# Patient Record
Sex: FEMALE | Race: WHITE | HISPANIC OR LATINO | Employment: UNEMPLOYED | URBAN - METROPOLITAN AREA
[De-identification: names, ages, dates, MRNs, and addresses within clinical notes are randomized per-mention and may not be internally consistent; named-entity substitution may affect disease eponyms.]

---

## 2017-03-13 ENCOUNTER — RESOLUTE PROFESSIONAL BILLING HOSPITAL PROF FEE (OUTPATIENT)
Dept: HOSPITALIST | Facility: MEDICAL CENTER | Age: 32
End: 2017-03-13
Payer: COMMERCIAL

## 2017-03-13 ENCOUNTER — HOSPITAL ENCOUNTER (OUTPATIENT)
Facility: MEDICAL CENTER | Age: 32
End: 2017-03-14
Attending: EMERGENCY MEDICINE | Admitting: INTERNAL MEDICINE
Payer: COMMERCIAL

## 2017-03-13 DIAGNOSIS — D50.0 IRON DEFICIENCY ANEMIA DUE TO CHRONIC BLOOD LOSS: ICD-10-CM

## 2017-03-13 LAB
ABO GROUP BLD: NORMAL
ABO GROUP BLD: NORMAL
ALBUMIN SERPL BCP-MCNC: 4.2 G/DL (ref 3.2–4.9)
ALBUMIN/GLOB SERPL: 1.4 G/DL
ALP SERPL-CCNC: 94 U/L (ref 30–99)
ALT SERPL-CCNC: 8 U/L (ref 2–50)
ANION GAP SERPL CALC-SCNC: 7 MMOL/L (ref 0–11.9)
ANISOCYTOSIS BLD QL SMEAR: ABNORMAL
APTT PPP: 33.2 SEC (ref 24.7–36)
AST SERPL-CCNC: 17 U/L (ref 12–45)
BARCODED ABORH UBTYP: 5100
BARCODED PRD CODE UBPRD: NORMAL
BARCODED UNIT NUM UBUNT: NORMAL
BASOPHILS # BLD AUTO: 0 % (ref 0–1.8)
BASOPHILS # BLD: 0 K/UL (ref 0–0.12)
BILIRUB SERPL-MCNC: 0.5 MG/DL (ref 0.1–1.5)
BLD GP AB SCN SERPL QL: NORMAL
BUN SERPL-MCNC: 6 MG/DL (ref 8–22)
CALCIUM SERPL-MCNC: 8.9 MG/DL (ref 8.5–10.5)
CHLORIDE SERPL-SCNC: 107 MMOL/L (ref 96–112)
CO2 SERPL-SCNC: 25 MMOL/L (ref 20–33)
COMPONENT R 8504R: NORMAL
CREAT SERPL-MCNC: 0.48 MG/DL (ref 0.5–1.4)
EOSINOPHIL # BLD AUTO: 0 K/UL (ref 0–0.51)
EOSINOPHIL NFR BLD: 0 % (ref 0–6.9)
ERYTHROCYTE [DISTWIDTH] IN BLOOD BY AUTOMATED COUNT: 48.7 FL (ref 35.9–50)
GFR SERPL CREATININE-BSD FRML MDRD: >60 ML/MIN/1.73 M 2
GIANT PLATELETS BLD QL SMEAR: NORMAL
GLOBULIN SER CALC-MCNC: 3 G/DL (ref 1.9–3.5)
GLUCOSE SERPL-MCNC: 105 MG/DL (ref 65–99)
HCG SERPL QL: NEGATIVE
HCT VFR BLD AUTO: 26 % (ref 37–47)
HGB BLD-MCNC: 6.7 G/DL (ref 12–16)
HYPOCHROMIA BLD QL SMEAR: ABNORMAL
INR PPP: 1 (ref 0.87–1.13)
LYMPHOCYTES # BLD AUTO: 0.81 K/UL (ref 1–4.8)
LYMPHOCYTES NFR BLD: 19.3 % (ref 22–41)
MANUAL DIFF BLD: NORMAL
MCH RBC QN AUTO: 16.5 PG (ref 27–33)
MCHC RBC AUTO-ENTMCNC: 25.8 G/DL (ref 33.6–35)
MCV RBC AUTO: 64.2 FL (ref 81.4–97.8)
MICROCYTES BLD QL SMEAR: ABNORMAL
MONOCYTES # BLD AUTO: 0.08 K/UL (ref 0–0.85)
MONOCYTES NFR BLD AUTO: 1.8 % (ref 0–13.4)
MORPHOLOGY BLD-IMP: NORMAL
NEUTROPHILS # BLD AUTO: 3.31 K/UL (ref 2–7.15)
NEUTROPHILS NFR BLD: 78.9 % (ref 44–72)
NRBC # BLD AUTO: 0 K/UL
NRBC BLD AUTO-RTO: 0 /100 WBC
OVALOCYTES BLD QL SMEAR: NORMAL
PLATELET # BLD AUTO: 184 K/UL (ref 164–446)
PLATELET BLD QL SMEAR: NORMAL
POIKILOCYTOSIS BLD QL SMEAR: NORMAL
POLYCHROMASIA BLD QL SMEAR: NORMAL
POTASSIUM SERPL-SCNC: 3.6 MMOL/L (ref 3.6–5.5)
PRODUCT TYPE UPROD: NORMAL
PROT SERPL-MCNC: 7.2 G/DL (ref 6–8.2)
PROTHROMBIN TIME: 13.5 SEC (ref 12–14.6)
RBC # BLD AUTO: 4.05 M/UL (ref 4.2–5.4)
RBC BLD AUTO: PRESENT
RH BLD: NORMAL
SCHISTOCYTES BLD QL SMEAR: NORMAL
SODIUM SERPL-SCNC: 139 MMOL/L (ref 135–145)
TROPONIN I SERPL-MCNC: <0.01 NG/ML (ref 0–0.04)
UNIT STATUS USTAT: NORMAL
WBC # BLD AUTO: 4.2 K/UL (ref 4.8–10.8)

## 2017-03-13 PROCEDURE — 86850 RBC ANTIBODY SCREEN: CPT

## 2017-03-13 PROCEDURE — 84703 CHORIONIC GONADOTROPIN ASSAY: CPT

## 2017-03-13 PROCEDURE — 84484 ASSAY OF TROPONIN QUANT: CPT

## 2017-03-13 PROCEDURE — 85610 PROTHROMBIN TIME: CPT

## 2017-03-13 PROCEDURE — 99285 EMERGENCY DEPT VISIT HI MDM: CPT

## 2017-03-13 PROCEDURE — 85007 BL SMEAR W/DIFF WBC COUNT: CPT

## 2017-03-13 PROCEDURE — G0378 HOSPITAL OBSERVATION PER HR: HCPCS

## 2017-03-13 PROCEDURE — 85027 COMPLETE CBC AUTOMATED: CPT

## 2017-03-13 PROCEDURE — 86901 BLOOD TYPING SEROLOGIC RH(D): CPT

## 2017-03-13 PROCEDURE — 99220 PR INITIAL OBSERVATION CARE,LEVL III: CPT | Performed by: INTERNAL MEDICINE

## 2017-03-13 PROCEDURE — 80053 COMPREHEN METABOLIC PANEL: CPT

## 2017-03-13 PROCEDURE — 86900 BLOOD TYPING SEROLOGIC ABO: CPT

## 2017-03-13 PROCEDURE — 85730 THROMBOPLASTIN TIME PARTIAL: CPT

## 2017-03-13 PROCEDURE — 36415 COLL VENOUS BLD VENIPUNCTURE: CPT

## 2017-03-13 PROCEDURE — 93005 ELECTROCARDIOGRAM TRACING: CPT | Performed by: EMERGENCY MEDICINE

## 2017-03-13 RX ORDER — FOLIC ACID 1 MG/1
1 TABLET ORAL DAILY
Status: DISCONTINUED | OUTPATIENT
Start: 2017-03-14 | End: 2017-03-14 | Stop reason: HOSPADM

## 2017-03-13 RX ORDER — AMOXICILLIN 250 MG
2 CAPSULE ORAL 2 TIMES DAILY
Status: DISCONTINUED | OUTPATIENT
Start: 2017-03-14 | End: 2017-03-14 | Stop reason: HOSPADM

## 2017-03-13 RX ORDER — BISACODYL 10 MG
10 SUPPOSITORY, RECTAL RECTAL
Status: DISCONTINUED | OUTPATIENT
Start: 2017-03-13 | End: 2017-03-14 | Stop reason: HOSPADM

## 2017-03-13 RX ORDER — ACETAMINOPHEN 325 MG/1
650 TABLET ORAL EVERY 6 HOURS PRN
Status: DISCONTINUED | OUTPATIENT
Start: 2017-03-13 | End: 2017-03-14 | Stop reason: HOSPADM

## 2017-03-13 RX ORDER — FERROUS SULFATE 325(65) MG
325 TABLET ORAL 3 TIMES DAILY
Status: ON HOLD | COMMUNITY
End: 2017-03-14

## 2017-03-13 RX ORDER — CHOLECALCIFEROL (VITAMIN D3) 125 MCG
500 CAPSULE ORAL DAILY
COMMUNITY

## 2017-03-13 RX ORDER — POLYETHYLENE GLYCOL 3350 17 G/17G
1 POWDER, FOR SOLUTION ORAL
Status: DISCONTINUED | OUTPATIENT
Start: 2017-03-13 | End: 2017-03-14 | Stop reason: HOSPADM

## 2017-03-13 RX ORDER — FERROUS SULFATE 325(65) MG
325 TABLET ORAL
Status: DISCONTINUED | OUTPATIENT
Start: 2017-03-13 | End: 2017-03-14

## 2017-03-13 ASSESSMENT — ENCOUNTER SYMPTOMS
BACK PAIN: 1
FOCAL WEAKNESS: 0
CHILLS: 0
CONSTIPATION: 0
NECK PAIN: 1
DIARRHEA: 0
SEIZURES: 0
HEADACHES: 1
DIZZINESS: 1
VOMITING: 0
ABDOMINAL PAIN: 1
LOSS OF CONSCIOUSNESS: 0
WEIGHT LOSS: 0
SPUTUM PRODUCTION: 0
COUGH: 0
FEVER: 0
PND: 0
TINGLING: 0
DEPRESSION: 0
BLURRED VISION: 0
DIZZINESS: 0
NAUSEA: 1
BLOOD IN STOOL: 1
SHORTNESS OF BREATH: 1
WEAKNESS: 1
DOUBLE VISION: 0
PHOTOPHOBIA: 0
PALPITATIONS: 1

## 2017-03-13 ASSESSMENT — LIFESTYLE VARIABLES
HAVE YOU EVER FELT YOU SHOULD CUT DOWN ON YOUR DRINKING: NO
HAVE PEOPLE ANNOYED YOU BY CRITICIZING YOUR DRINKING: NO
ON A TYPICAL DAY WHEN YOU DRINK ALCOHOL HOW MANY DRINKS DO YOU HAVE: 2
EVER HAD A DRINK FIRST THING IN THE MORNING TO STEADY YOUR NERVES TO GET RID OF A HANGOVER: NO
DO YOU DRINK ALCOHOL: YES
HOW MANY TIMES IN THE PAST YEAR HAVE YOU HAD 5 OR MORE DRINKS IN A DAY: 2
CONSUMPTION TOTAL: POSITIVE
AVERAGE NUMBER OF DAYS PER WEEK YOU HAVE A DRINK CONTAINING ALCOHOL: 2
EVER FELT BAD OR GUILTY ABOUT YOUR DRINKING: NO
TOTAL SCORE: 0

## 2017-03-13 ASSESSMENT — PAIN SCALES - GENERAL
PAINLEVEL_OUTOF10: 7
PAINLEVEL_OUTOF10: 8

## 2017-03-13 NOTE — IP AVS SNAPSHOT
" Home Care Instructions                                                                                                                  Name:Felisha Martin  Medical Record Number:[id number]  CSN: [id number]    YOB: 1985   Age: 31 y.o.  Sex: female  HT:1.6 m (5' 3\") WT: 68.5 kg (151 lb 0.2 oz)          Admit Date: 3/13/2017     Discharge Date:   Today's Date: 3/14/2017  Attending Doctor:  Yolanda Martínez M.D.                  Allergies:  Review of patient's allergies indicates no known allergies.            Discharge Instructions       Discharge Instructions    Discharged to home by car with relative. Discharged via walking, hospital escort: Yes.  Special equipment needed: Not Applicable    Be sure to schedule a follow-up appointment with your primary care doctor or any specialists as instructed.     Discharge Plan:   Diet Plan: Discussed (Regular)  Activity Level: Discussed (As tolerated)  Confirmed Follow up Appointment: Patient to Call and Schedule Appointment  Confirmed Symptoms Management: Discussed  Medication Reconciliation Updated: Yes  Influenza Vaccine Indication: Not indicated: Previously immunized this influenza season and > 8 years of age    I understand that a diet low in cholesterol, fat, and sodium is recommended for good health. Unless I have been given specific instructions below for another diet, I accept this instruction as my diet prescription.   Other diet: Regular    Special Instructions: None    · Is patient discharged on Warfarin / Coumadin?   No     · Is patient Post Blood Transfusion?  No    Depression / Suicide Risk    As you are discharged from this [facility] facility, it is important to learn how to keep safe from harming yourself.    Recognize the warning signs:  · Abrupt changes in personality, positive or negative- including increase in energy   · Giving away possessions  · Change in eating patterns- significant weight changes-  positive or negative  · Change in sleeping " patterns- unable to sleep or sleeping all the time   · Unwillingness or inability to communicate  · Depression  · Unusual sadness, discouragement and loneliness  · Talk of wanting to die  · Neglect of personal appearance   · Rebelliousness- reckless behavior  · Withdrawal from people/activities they love  · Confusion- inability to concentrate     If you or a loved one observes any of these behaviors or has concerns about self-harm, here's what you can do:  · Talk about it- your feelings and reasons for harming yourself  · Remove any means that you might use to hurt yourself (examples: pills, rope, extension cords, firearm)  · Get professional help from the community (Mental Health, Substance Abuse, psychological counseling)  · Do not be alone:Call your Safe Contact- someone whom you trust who will be there for you.  · Call your local CRISIS HOTLINE 456-9947 or 865-495-6019  · Call your local Children's Mobile Crisis Response Team [place] (350) 752-0220 or www.Reppler  · Call the toll free National Suicide Prevention Hotlines   · National Suicide Prevention Lifeline 816-702-NZEL (3576)  · [employer] Hope Line Network 800-SUICIDE (173-4334)    Activity: As tolerated.   Diet: regular   Followup:   -PCP 1 week   -GI referral outpatient   -GYN consult outpatient   Instructions:   -Given instructions to return to the ER if any new or worsening symptoms, worsening condition, or failure to improve   -Call PCP for followup   -No smoking, no alcohol, no caffeine   -Encourage risk factor reduction with tobacco and alcohol abstinence, diet changes, weight loss, and exercise.    Your appointments     Mar 15, 2017 11:30 AM   CARE MANAGER TELEPHONE VISIT with CARE MANAGER   [facility] ([facility])    [address] [place] [address]  [place] [address] [phone]           ***IMPORTANT**** This is a phone visit only. Do not come into the office. The Care Manager will call you at the scheduled time for Care  Manager Telephone Visit.            Mar 16, 2017  3:20 PM   Established Patient with CHANA Ceron   [facility] ([facility])    [address] [place] [address] [address]  [place] [address] [phone]           You will be receiving a confirmation call a few days before your appointment from our automated call confirmation system.            Mar 18, 2017 10:30 AM   New Total Body Iron with RN 4   [facility] ([facility])    [address] [facility] [address]  [place] [id number] [phone]              Follow-up Information     1. Follow up with Your PCP. Schedule an appointment as soon as possible for a visit in 1 week.        2. Call GI consult.    Why:  Discuss with PCP for referral        3. Call GYN consult.    Why:  Discuss with PCP for referral        4. Follow up with Tanya Neal M.D.. Schedule an appointment as soon as possible for a visit in 1 week.    Specialty:  Oncology    Why:  for hematology followup    Contact information    [address]   [place] [id number] [phone]           Discharge Medication Instructions:    Below are the medications your physician expects you to take upon discharge:    Review all your home medications and newly ordered medications with your doctor and/or pharmacist. Follow medication instructions as directed by your doctor and/or pharmacist.    Please keep your medication list with you and share with your physician.               Medication List      START taking these medications        Instructions    Cholecalciferol 1000 UNIT Tabs   Commonly known as:  VITAMIND D3    Take 1 Tab by mouth every day.   Dose:  1000 Units       multivitamin Tabs   Last time this was given:  1 Tab on 3/14/2017  8:50 AM    Take 1 Tab by mouth every day.   Dose:  1 Tab       omeprazole 20 MG delayed-release capsule   Commonly known as:  PRILOSEC    Take 1 Cap by mouth 2 times a day. Take BID for 30 days then daily   Dose:  20 mg         CONTINUE taking these medications         Instructions    cyanocobalamin 500 MCG Tabs   Commonly known as:  VITAMIN B-12    Take 500 mcg by mouth every day.   Dose:  500 mcg       PRENATAL #2 PO    Take 1 Tab by mouth every day.   Dose:  1 Tab         STOP taking these medications     IRON SUPPLEMENT 325 (65 FE) MG tablet   Generic drug:  ferrous sulfate               Instructions           Diet / Nutrition:    Follow any diet instructions given to you by your doctor or the dietician, including how much salt (sodium) you are allowed each day.    If you are overweight, talk to your doctor about a weight reduction plan.    Activity:    Remain physically active following your doctor's instructions about exercise and activity.    Rest often.     Any time you become even a little tired or short of breath, SIT DOWN and rest.    Worsening Symptoms:    Report any of the following signs and symptoms to the doctor's office immediately:    *Pain of jaw, arm, or neck  *Chest pain not relieved by medication                               *Dizziness or loss of consciousness  *Difficulty breathing even when at rest   *More tired than usual                                       *Bleeding drainage or swelling of surgical site  *Swelling of feet, ankles, legs or stomach                 *Fever (>100ºF)  *Pink or blood tinged sputum  *Weight gain (3lbs/day or 5lbs /week)           *Shock from internal defibrillator (if applicable)  *Palpitations or irregular heartbeats                *Cool and/or numb extremities    Stroke Awareness    Common Risk Factors for Stroke include:    Age  Atrial Fibrillation  Carotid Artery Stenosis  Diabetes Mellitus  Excessive alcohol consumption  High blood pressure  Overweight   Physical inactivity  Smoking    Warning signs and symptoms of a stroke include:    *Sudden numbness or weakness of the face, arm or leg (especially on one side of the body).  *Sudden confusion, trouble speaking or understanding.  *Sudden trouble seeing in one or both  eyes.  *Sudden trouble walking, dizziness, loss of balance or coordination.Sudden severe headache with no known cause.    It is very important to get treatment quickly when a stroke occurs. If you experience any of the above warning signs, call 911 immediately.                   Disclaimer         Quit Smoking / Tobacco Use:    I understand the use of any tobacco products increases my chance of suffering from future heart disease or stroke and could cause other illnesses which may shorten my life. Quitting the use of tobacco products is the single most important thing I can do to improve my health. For further information on smoking / tobacco cessation call a Toll Free Quit Line at 1-557.838.3434 (*National Cancer [place]) or 1-659.202.5764 (American Lung Association) or you can access the web based program at www.lungMicrotask.org.    [place] Tobacco Users Help Line:  (520) 115-3550       Toll Free: 1-218.684.7032  Quit Tobacco Program [facility] (463)334-1972    Crisis Hotline:    [place] Crisis Hotline:  2-287-FVNVHJF or 1-262.702.8130    [place] Crisis Hotline:    1-429.185.9893 or 287-074-4257    Discharge Survey:   Thank you for choosing [facility]. We hope we did everything we could to make your hospital stay a pleasant one. You may be receiving a phone survey and we would appreciate your time and participation in answering the questions. Your input is very valuable to us in our efforts to improve our service to our patients and their families.        My signature on this form indicates that:    1. I have reviewed and understand the above information.  2. My questions regarding this information have been answered to my satisfaction.  3. I have formulated a plan with my discharge nurse to obtain my prescribed medications for home.                  Disclaimer         __________________________________                     __________       ________                       Patient Signature                                                  Date                    Time

## 2017-03-14 ENCOUNTER — PATIENT OUTREACH (OUTPATIENT)
Dept: HEALTH INFORMATION MANAGEMENT | Facility: OTHER | Age: 32
End: 2017-03-14

## 2017-03-14 VITALS
WEIGHT: 151.01 LBS | BODY MASS INDEX: 26.76 KG/M2 | HEART RATE: 60 BPM | SYSTOLIC BLOOD PRESSURE: 106 MMHG | HEIGHT: 63 IN | RESPIRATION RATE: 16 BRPM | DIASTOLIC BLOOD PRESSURE: 69 MMHG | OXYGEN SATURATION: 98 % | TEMPERATURE: 97.7 F

## 2017-03-14 PROBLEM — D50.0 IRON DEFICIENCY ANEMIA DUE TO CHRONIC BLOOD LOSS: Status: RESOLVED | Noted: 2017-03-13 | Resolved: 2017-03-14

## 2017-03-14 LAB
25(OH)D3 SERPL-MCNC: 10 NG/ML (ref 30–100)
ALBUMIN SERPL BCP-MCNC: 3.7 G/DL (ref 3.2–4.9)
ALBUMIN SERPL BCP-MCNC: 3.7 G/DL (ref 3.2–4.9)
ALBUMIN/GLOB SERPL: 1.4 G/DL
ALBUMIN/GLOB SERPL: 1.4 G/DL
ALP SERPL-CCNC: 82 U/L (ref 30–99)
ALP SERPL-CCNC: 83 U/L (ref 30–99)
ALT SERPL-CCNC: 7 U/L (ref 2–50)
ALT SERPL-CCNC: 9 U/L (ref 2–50)
AMORPH CRY #/AREA URNS HPF: PRESENT /HPF
ANION GAP SERPL CALC-SCNC: 5 MMOL/L (ref 0–11.9)
ANION GAP SERPL CALC-SCNC: 5 MMOL/L (ref 0–11.9)
ANISOCYTOSIS BLD QL SMEAR: ABNORMAL
APPEARANCE UR: ABNORMAL
AST SERPL-CCNC: 13 U/L (ref 12–45)
AST SERPL-CCNC: 13 U/L (ref 12–45)
BACTERIA #/AREA URNS HPF: ABNORMAL /HPF
BASOPHILS # BLD AUTO: 1.1 % (ref 0–1.8)
BASOPHILS # BLD AUTO: 4.4 % (ref 0–1.8)
BASOPHILS # BLD: 0.05 K/UL (ref 0–0.12)
BASOPHILS # BLD: 0.19 K/UL (ref 0–0.12)
BILIRUB SERPL-MCNC: 0.5 MG/DL (ref 0.1–1.5)
BILIRUB SERPL-MCNC: 1 MG/DL (ref 0.1–1.5)
BILIRUB UR QL STRIP.AUTO: NEGATIVE
BUN SERPL-MCNC: 6 MG/DL (ref 8–22)
BUN SERPL-MCNC: 6 MG/DL (ref 8–22)
CALCIUM SERPL-MCNC: 8.3 MG/DL (ref 8.5–10.5)
CALCIUM SERPL-MCNC: 8.4 MG/DL (ref 8.5–10.5)
CHLORIDE SERPL-SCNC: 110 MMOL/L (ref 96–112)
CHLORIDE SERPL-SCNC: 111 MMOL/L (ref 96–112)
CO2 SERPL-SCNC: 23 MMOL/L (ref 20–33)
CO2 SERPL-SCNC: 23 MMOL/L (ref 20–33)
COLOR UR: YELLOW
CREAT SERPL-MCNC: 0.47 MG/DL (ref 0.5–1.4)
CREAT SERPL-MCNC: 0.47 MG/DL (ref 0.5–1.4)
DACRYOCYTES BLD QL SMEAR: NORMAL
EOSINOPHIL # BLD AUTO: 0.09 K/UL (ref 0–0.51)
EOSINOPHIL # BLD AUTO: 0.15 K/UL (ref 0–0.51)
EOSINOPHIL NFR BLD: 2 % (ref 0–6.9)
EOSINOPHIL NFR BLD: 3.5 % (ref 0–6.9)
EPI CELLS #/AREA URNS HPF: ABNORMAL /HPF
ERYTHROCYTE [DISTWIDTH] IN BLOOD BY AUTOMATED COUNT: 47.3 FL (ref 35.9–50)
ERYTHROCYTE [DISTWIDTH] IN BLOOD BY AUTOMATED COUNT: 56.9 FL (ref 35.9–50)
FERRITIN SERPL-MCNC: 2.5 NG/ML (ref 10–291)
FOLATE SERPL-MCNC: 23.1 NG/ML
GFR SERPL CREATININE-BSD FRML MDRD: >60 ML/MIN/1.73 M 2
GFR SERPL CREATININE-BSD FRML MDRD: >60 ML/MIN/1.73 M 2
GIANT PLATELETS BLD QL SMEAR: NORMAL
GLOBULIN SER CALC-MCNC: 2.6 G/DL (ref 1.9–3.5)
GLOBULIN SER CALC-MCNC: 2.7 G/DL (ref 1.9–3.5)
GLUCOSE SERPL-MCNC: 93 MG/DL (ref 65–99)
GLUCOSE SERPL-MCNC: 99 MG/DL (ref 65–99)
GLUCOSE UR STRIP.AUTO-MCNC: NEGATIVE MG/DL
HCT VFR BLD AUTO: 24.3 % (ref 37–47)
HCT VFR BLD AUTO: 27.9 % (ref 37–47)
HGB BLD-MCNC: 6.3 G/DL (ref 12–16)
HGB BLD-MCNC: 7.6 G/DL (ref 12–16)
HGB RETIC QN AUTO: 17 PG/CELL (ref 29–35)
HYPOCHROMIA BLD QL SMEAR: ABNORMAL
IMM GRANULOCYTES # BLD AUTO: 0.01 K/UL (ref 0–0.11)
IMM GRANULOCYTES NFR BLD AUTO: 0.2 % (ref 0–0.9)
IMM RETICS NFR: 13.5 % (ref 9.3–17.4)
IRON SATN MFR SERPL: 3 % (ref 15–55)
IRON SERPL-MCNC: 14 UG/DL (ref 40–170)
KETONES UR STRIP.AUTO-MCNC: NEGATIVE MG/DL
LEUKOCYTE ESTERASE UR QL STRIP.AUTO: ABNORMAL
LG PLATELETS BLD QL SMEAR: NORMAL
LYMPHOCYTES # BLD AUTO: 1.58 K/UL (ref 1–4.8)
LYMPHOCYTES # BLD AUTO: 2.13 K/UL (ref 1–4.8)
LYMPHOCYTES NFR BLD: 36 % (ref 22–41)
LYMPHOCYTES NFR BLD: 47.7 % (ref 22–41)
MANUAL DIFF BLD: NORMAL
MCH RBC QN AUTO: 16.4 PG (ref 27–33)
MCH RBC QN AUTO: 18.1 PG (ref 27–33)
MCHC RBC AUTO-ENTMCNC: 25.9 G/DL (ref 33.6–35)
MCHC RBC AUTO-ENTMCNC: 27.2 G/DL (ref 33.6–35)
MCV RBC AUTO: 63.1 FL (ref 81.4–97.8)
MCV RBC AUTO: 66.4 FL (ref 81.4–97.8)
MICRO URNS: ABNORMAL
MICROCYTES BLD QL SMEAR: ABNORMAL
MONOCYTES # BLD AUTO: 0.15 K/UL (ref 0–0.85)
MONOCYTES # BLD AUTO: 0.25 K/UL (ref 0–0.85)
MONOCYTES NFR BLD AUTO: 3.5 % (ref 0–13.4)
MONOCYTES NFR BLD AUTO: 5.6 % (ref 0–13.4)
MORPHOLOGY BLD-IMP: NORMAL
MUCOUS THREADS #/AREA URNS HPF: ABNORMAL /HPF
MYELOCYTES NFR BLD MANUAL: 0.9 %
NEUTROPHILS # BLD AUTO: 1.94 K/UL (ref 2–7.15)
NEUTROPHILS # BLD AUTO: 2.27 K/UL (ref 2–7.15)
NEUTROPHILS NFR BLD: 43.4 % (ref 44–72)
NEUTROPHILS NFR BLD: 51.7 % (ref 44–72)
NITRITE UR QL STRIP.AUTO: NEGATIVE
NRBC # BLD AUTO: 0 K/UL
NRBC # BLD AUTO: 0 K/UL
NRBC BLD AUTO-RTO: 0 /100 WBC
NRBC BLD AUTO-RTO: 0 /100 WBC
OVALOCYTES BLD QL SMEAR: NORMAL
PH UR STRIP.AUTO: 6.5 [PH]
PLATELET # BLD AUTO: 176 K/UL (ref 164–446)
PLATELET # BLD AUTO: 177 K/UL (ref 164–446)
PLATELET BLD QL SMEAR: NORMAL
POIKILOCYTOSIS BLD QL SMEAR: NORMAL
POTASSIUM SERPL-SCNC: 3.5 MMOL/L (ref 3.6–5.5)
POTASSIUM SERPL-SCNC: 3.5 MMOL/L (ref 3.6–5.5)
PROT SERPL-MCNC: 6.3 G/DL (ref 6–8.2)
PROT SERPL-MCNC: 6.4 G/DL (ref 6–8.2)
PROT UR QL STRIP: NEGATIVE MG/DL
RBC # BLD AUTO: 3.85 M/UL (ref 4.2–5.4)
RBC # BLD AUTO: 4.2 M/UL (ref 4.2–5.4)
RBC # URNS HPF: ABNORMAL /HPF
RBC BLD AUTO: PRESENT
RBC UR QL AUTO: ABNORMAL
RETICS # AUTO: 0.05 M/UL (ref 0.04–0.06)
RETICS/RBC NFR: 1.4 % (ref 0.8–2.1)
SCHISTOCYTES BLD QL SMEAR: NORMAL
SODIUM SERPL-SCNC: 138 MMOL/L (ref 135–145)
SODIUM SERPL-SCNC: 139 MMOL/L (ref 135–145)
SP GR UR STRIP.AUTO: 1.01
TIBC SERPL-MCNC: 510 UG/DL (ref 250–450)
TSH SERPL DL<=0.005 MIU/L-ACNC: 1.77 UIU/ML (ref 0.3–3.7)
VIT B12 SERPL-MCNC: 244 PG/ML (ref 211–911)
WBC # BLD AUTO: 4.4 K/UL (ref 4.8–10.8)
WBC # BLD AUTO: 4.5 K/UL (ref 4.8–10.8)
WBC #/AREA URNS HPF: ABNORMAL /HPF

## 2017-03-14 PROCEDURE — 700111 HCHG RX REV CODE 636 W/ 250 OVERRIDE (IP)

## 2017-03-14 PROCEDURE — 82728 ASSAY OF FERRITIN: CPT

## 2017-03-14 PROCEDURE — 96366 THER/PROPH/DIAG IV INF ADDON: CPT

## 2017-03-14 PROCEDURE — 36430 TRANSFUSION BLD/BLD COMPNT: CPT

## 2017-03-14 PROCEDURE — 700102 HCHG RX REV CODE 250 W/ 637 OVERRIDE(OP): Performed by: INTERNAL MEDICINE

## 2017-03-14 PROCEDURE — 85025 COMPLETE CBC W/AUTO DIFF WBC: CPT

## 2017-03-14 PROCEDURE — 86923 COMPATIBILITY TEST ELECTRIC: CPT

## 2017-03-14 PROCEDURE — 83540 ASSAY OF IRON: CPT

## 2017-03-14 PROCEDURE — 82607 VITAMIN B-12: CPT

## 2017-03-14 PROCEDURE — 85046 RETICYTE/HGB CONCENTRATE: CPT

## 2017-03-14 PROCEDURE — A9270 NON-COVERED ITEM OR SERVICE: HCPCS

## 2017-03-14 PROCEDURE — 85007 BL SMEAR W/DIFF WBC COUNT: CPT

## 2017-03-14 PROCEDURE — 80053 COMPREHEN METABOLIC PANEL: CPT

## 2017-03-14 PROCEDURE — 84443 ASSAY THYROID STIM HORMONE: CPT

## 2017-03-14 PROCEDURE — 700102 HCHG RX REV CODE 250 W/ 637 OVERRIDE(OP)

## 2017-03-14 PROCEDURE — 99217 PR OBSERVATION CARE DISCHARGE: CPT | Performed by: INTERNAL MEDICINE

## 2017-03-14 PROCEDURE — 36415 COLL VENOUS BLD VENIPUNCTURE: CPT

## 2017-03-14 PROCEDURE — G0378 HOSPITAL OBSERVATION PER HR: HCPCS

## 2017-03-14 PROCEDURE — P9016 RBC LEUKOCYTES REDUCED: HCPCS

## 2017-03-14 PROCEDURE — A9270 NON-COVERED ITEM OR SERVICE: HCPCS | Performed by: INTERNAL MEDICINE

## 2017-03-14 PROCEDURE — 302128 INFUSION PUMP: Performed by: INTERNAL MEDICINE

## 2017-03-14 PROCEDURE — 96365 THER/PROPH/DIAG IV INF INIT: CPT

## 2017-03-14 PROCEDURE — 85027 COMPLETE CBC AUTOMATED: CPT

## 2017-03-14 PROCEDURE — 700105 HCHG RX REV CODE 258

## 2017-03-14 PROCEDURE — 81001 URINALYSIS AUTO W/SCOPE: CPT

## 2017-03-14 PROCEDURE — 83550 IRON BINDING TEST: CPT

## 2017-03-14 PROCEDURE — 700102 HCHG RX REV CODE 250 W/ 637 OVERRIDE(OP): Performed by: NURSE PRACTITIONER

## 2017-03-14 PROCEDURE — 82746 ASSAY OF FOLIC ACID SERUM: CPT

## 2017-03-14 PROCEDURE — 82306 VITAMIN D 25 HYDROXY: CPT

## 2017-03-14 PROCEDURE — A9270 NON-COVERED ITEM OR SERVICE: HCPCS | Performed by: NURSE PRACTITIONER

## 2017-03-14 RX ORDER — OMEPRAZOLE 20 MG/1
20 CAPSULE, DELAYED RELEASE ORAL 2 TIMES DAILY
Status: DISCONTINUED | OUTPATIENT
Start: 2017-03-14 | End: 2017-03-14 | Stop reason: HOSPADM

## 2017-03-14 RX ORDER — OMEPRAZOLE 20 MG/1
20 CAPSULE, DELAYED RELEASE ORAL 2 TIMES DAILY
Qty: 60 CAP | Refills: 3 | Status: SHIPPED | OUTPATIENT
Start: 2017-03-14 | End: 2020-03-19

## 2017-03-14 RX ORDER — DIPHENHYDRAMINE HYDROCHLORIDE 50 MG/ML
25 INJECTION INTRAMUSCULAR; INTRAVENOUS ONCE
Status: COMPLETED | OUTPATIENT
Start: 2017-03-14 | End: 2017-03-14

## 2017-03-14 RX ORDER — DIPHENHYDRAMINE HCL 25 MG
25 TABLET ORAL ONCE
Status: COMPLETED | OUTPATIENT
Start: 2017-03-14 | End: 2017-03-14

## 2017-03-14 RX ORDER — ACETAMINOPHEN 325 MG/1
650 TABLET ORAL ONCE
Status: COMPLETED | OUTPATIENT
Start: 2017-03-14 | End: 2017-03-14

## 2017-03-14 RX ADMIN — IRON DEXTRAN 1400 MG: 50 INJECTION INTRAMUSCULAR; INTRAVENOUS at 14:13

## 2017-03-14 RX ADMIN — Medication 325 MG: at 00:34

## 2017-03-14 RX ADMIN — FOLIC ACID 1 MG: 1 TABLET ORAL at 08:50

## 2017-03-14 RX ADMIN — ACETAMINOPHEN 650 MG: 325 TABLET, FILM COATED ORAL at 08:50

## 2017-03-14 RX ADMIN — ACETAMINOPHEN 650 MG: 325 TABLET, FILM COATED ORAL at 11:00

## 2017-03-14 RX ADMIN — DIPHENHYDRAMINE HCL 25 MG: 25 TABLET ORAL at 11:00

## 2017-03-14 RX ADMIN — IRON DEXTRAN 25 MG: 50 INJECTION INTRAMUSCULAR; INTRAVENOUS at 11:00

## 2017-03-14 RX ADMIN — CHOLECALCIFEROL TAB 25 MCG (1000 UNIT) 1000 UNITS: 25 TAB at 18:20

## 2017-03-14 RX ADMIN — Medication 325 MG: at 08:50

## 2017-03-14 RX ADMIN — THERA TABS 1 TABLET: TAB at 08:50

## 2017-03-14 RX ADMIN — STANDARDIZED SENNA CONCENTRATE AND DOCUSATE SODIUM 2 TABLET: 8.6; 5 TABLET, FILM COATED ORAL at 08:49

## 2017-03-14 ASSESSMENT — LIFESTYLE VARIABLES: EVER_SMOKED: NEVER

## 2017-03-14 ASSESSMENT — PAIN SCALES - GENERAL: PAINLEVEL_OUTOF10: 7

## 2017-03-14 NOTE — H&P
"       [facility] Admitting History and Physical    Name Felisha Martin       1985   Age/Sex 31 y.o. female   MRN [id number]   Code Status Full code       Attending/Team: Dr. Varela      Chief Complaint:  Sent by MD due to low HB    HPI:  Ms. Martin is a 32 yo female with PMH of depression, sent by Dr. Neal, hematologist for low hb. She was having shortness of breath on mild exertion, palpitations, headaches and feeling lethargic for the last 6 months - 1 year, she was seen by her PCP in December and February and found to have low hb and referred to hematology and Ob gyn. She has menorrhagia, regular 30 day cycles, lasting 5-6 days with clots and heavy bleeding, associated with cramps before and after bleeding. She received blood tx after having her second baby in , she is not on any hormonal contraceptives.  She had few episodes of dark tarry stools, no NM bleeding.  Her diet mainly consists of \"junk food with minimal green vegetables. She is not a vegetarian .           Review of Systems   Constitutional: Positive for malaise/fatigue. Negative for fever, chills and weight loss.   HENT: Negative for hearing loss.    Eyes: Negative for blurred vision, double vision and photophobia.   Respiratory: Positive for shortness of breath. Negative for cough and sputum production.    Cardiovascular: Positive for palpitations and leg swelling. Negative for chest pain and PND.   Gastrointestinal: Positive for nausea, abdominal pain and blood in stool. Negative for vomiting, diarrhea and constipation.   Genitourinary: Negative for dysuria and urgency.   Musculoskeletal: Positive for back pain and neck pain.   Skin: Negative for rash.   Neurological: Positive for weakness and headaches. Negative for dizziness, tingling, focal weakness, seizures and loss of consciousness.   Psychiatric/Behavioral: Negative for depression.             Past Medical History:   Past Medical History   Diagnosis Date   • Anemia  " "  • Depression        Past Surgical History:  History reviewed. No pertinent past surgical history.    Current Outpatient Medications:  Home Medications     Reviewed by Elyssa Elaine (Pharmacy Tech) on 03/13/17 at 2037  Med List Status: Complete    Medication Last Dose Status    cyanocobalamin (VITAMIN B-12) 500 MCG Tab 3/13/2017 Active    ferrous sulfate (IRON SUPPLEMENT) 325 (65 FE) MG tablet 3/13/2017 Active    Prenatal Multivit-Min-Fe-FA (PRENATAL #2 PO) 3/13/2017 Active                Medication Allergy/Sensitivities:  No Known Allergies      Family History:  History reviewed. No pertinent family history.    Social History:  Social History     Social History   • Marital Status: Single     Spouse Name: N/A   • Number of Children: N/A   • Years of Education: N/A     Occupational History   • Not on file.     Social History Main Topics   • Smoking status: Never Smoker    • Smokeless tobacco: Not on file   • Alcohol Use: Yes   • Drug Use: Yes      Comment: mj   • Sexual Activity: Not on file     Other Topics Concern   • Not on file     Social History Narrative   • No narrative on file     Living situation: lives at home   PCP : Pcp Not In Computer        Physical Exam     Filed Vitals:    03/13/17 1614 03/13/17 1659   BP: 121/69    Pulse: 81    Temp: 37.2 °C (98.9 °F)    TempSrc: Temporal    Resp: 16    Height: 1.6 m (5' 3\")    Weight:  71.5 kg (157 lb 10.1 oz)   SpO2: 100%      Body mass index is 27.93 kg/(m^2).  /69 mmHg  Pulse 81  Temp(Src) 37.2 °C (98.9 °F) (Temporal)  Resp 16  Ht 1.6 m (5' 3\")  Wt 71.5 kg (157 lb 10.1 oz)  BMI 27.93 kg/m2  SpO2 100%  LMP 03/13/2017  O2 therapy: Pulse Oximetry: 100 %    Physical Exam   Constitutional: She is oriented to person, place, and time and well-developed, well-nourished, and in no distress.   HENT:   Head: Normocephalic and atraumatic.   Eyes: EOM are normal. Pupils are equal, round, and reactive to light.   Mild palor noted   Neck: Normal range " of motion. No thyromegaly present.   Cardiovascular: Normal rate, regular rhythm and normal heart sounds.    No murmur heard.  Pulmonary/Chest: Effort normal and breath sounds normal. No respiratory distress. She has no wheezes.   Abdominal: Soft. Bowel sounds are normal. She exhibits no distension and no mass. There is no rebound and no guarding.   Minimal tenderness on left lower quadrant   Neurological: She is alert and oriented to person, place, and time. She displays normal reflexes. No cranial nerve deficit. Gait normal. GCS score is 15.   Skin: Skin is warm.   Psychiatric: Affect normal.             Data Review       Old Records Request:   Completed  Current Records review and summary: Completed    Lab Data Review:  Recent Results (from the past 24 hour(s))   CBC WITH DIFFERENTIAL    Collection Time: 03/13/17  6:52 PM   Result Value Ref Range    WBC 4.2 (L) 4.8 - 10.8 K/uL    RBC 4.05 (L) 4.20 - 5.40 M/uL    Hemoglobin 6.7 (L) 12.0 - 16.0 g/dL    Hematocrit 26.0 (L) 37.0 - 47.0 %    MCV 64.2 (L) 81.4 - 97.8 fL    MCH 16.5 (L) 27.0 - 33.0 pg    MCHC 25.8 (L) 33.6 - 35.0 g/dL    RDW 48.7 35.9 - 50.0 fL    Platelet Count 184 164 - 446 K/uL    Nucleated RBC 0.00 /100 WBC    NRBC (Absolute) 0.00 K/uL    Neutrophils-Polys 78.90 (H) 44.00 - 72.00 %    Lymphocytes 19.30 (L) 22.00 - 41.00 %    Monocytes 1.80 0.00 - 13.40 %    Eosinophils 0.00 0.00 - 6.90 %    Basophils 0.00 0.00 - 1.80 %    Neutrophils (Absolute) 3.31 2.00 - 7.15 K/uL    Lymphs (Absolute) 0.81 (L) 1.00 - 4.80 K/uL    Monos (Absolute) 0.08 0.00 - 0.85 K/uL    Eos (Absolute) 0.00 0.00 - 0.51 K/uL    Baso (Absolute) 0.00 0.00 - 0.12 K/uL    Hypochromia 2+     Anisocytosis 2+     Microcytosis 2+    COMP METABOLIC PANEL    Collection Time: 03/13/17  6:52 PM   Result Value Ref Range    Sodium 139 135 - 145 mmol/L    Potassium 3.6 3.6 - 5.5 mmol/L    Chloride 107 96 - 112 mmol/L    Co2 25 20 - 33 mmol/L    Anion Gap 7.0 0.0 - 11.9    Glucose 105 (H) 65 - 99  mg/dL    Bun 6 (L) 8 - 22 mg/dL    Creatinine 0.48 (L) 0.50 - 1.40 mg/dL    Calcium 8.9 8.5 - 10.5 mg/dL    AST(SGOT) 17 12 - 45 U/L    ALT(SGPT) 8 2 - 50 U/L    Alkaline Phosphatase 94 30 - 99 U/L    Total Bilirubin 0.5 0.1 - 1.5 mg/dL    Albumin 4.2 3.2 - 4.9 g/dL    Total Protein 7.2 6.0 - 8.2 g/dL    Globulin 3.0 1.9 - 3.5 g/dL    A-G Ratio 1.4 g/dL   TROPONIN    Collection Time: 03/13/17  6:52 PM   Result Value Ref Range    Troponin I <0.01 0.00 - 0.04 ng/mL   APTT    Collection Time: 03/13/17  6:52 PM   Result Value Ref Range    APTT 33.2 24.7 - 36.0 sec   PROTHROMBIN TIME (INR)    Collection Time: 03/13/17  6:52 PM   Result Value Ref Range    PT 13.5 12.0 - 14.6 sec    INR 1.00 0.87 - 1.13   HCG QUAL SERUM    Collection Time: 03/13/17  6:52 PM   Result Value Ref Range    Beta-Hcg Qualitative Serum Negative Negative   COD (ADULT)    Collection Time: 03/13/17  6:52 PM   Result Value Ref Range    ABO Grouping Only O     Rh Grouping Only POS     Antibody Screen-Cod NEG    ESTIMATED GFR    Collection Time: 03/13/17  6:52 PM   Result Value Ref Range    GFR If African American >60 >60 mL/min/1.73 m 2    GFR If Non African American >60 >60 mL/min/1.73 m 2   DIFFERENTIAL MANUAL    Collection Time: 03/13/17  6:52 PM   Result Value Ref Range    Manual Diff Status PERFORMED    PERIPHERAL SMEAR REVIEW    Collection Time: 03/13/17  6:52 PM   Result Value Ref Range    Peripheral Smear Review see below    PLATELET ESTIMATE    Collection Time: 03/13/17  6:52 PM   Result Value Ref Range    Plt Estimation Normal    MORPHOLOGY    Collection Time: 03/13/17  6:52 PM   Result Value Ref Range    RBC Morphology Present     Giant Platelets 2+     Polychromia 1+     Poikilocytosis 2+     Ovalocytes 2+     Schistocytes 1+    ABO CONFIRMATION    Collection Time: 03/13/17  7:00 PM   Result Value Ref Range    Second ABO Typing With Cod O        Imaging/Procedures Review:    ndependant Imaging Review: Completed  No orders to display             EKG:   EKG Independant Review: Completed  QTc:444, HR: 71, Normal Sinus Rhythm, no ST/T changes     Records reviewed and summarized in current documentation             Assessment/Plan     Symptomatic Iron deficiency anemia  Symptomatic anemia with hb 6.7    Lab work in December 2016 showed hypochromic microcytic anemia, with low iron consistent with Fe deficiency anemia  Hx of menorrhagia for about an year, and dark tarry stools  Hypochromic microcytic picture with low iron, high tibc low saturation  Hemodynamically stable    Plan  Admit to medical  Transfuse 1 U PRBC  Monitor H and H  Iron studies, vitamin b12, folate, vitamin D, retic count, stool occult blood, TSH with reflex to T4, - ordered  Ferrous sulfate 325 mg TID  Folic acid 1 mg QD  Multivitamins 1 tab QD  Need Ob- gyn consult for assessment of menorrhagia   GI referral for colonoscopy- can be done as an outpatient    Anticipated Hospital stay: Observation admit        Quality Measures  EKG reviewed, Labs reviewed, Radiology images reviewed and Medications reviewed  Chappell catheter: No Chappell      DVT Prophylaxis: Contraindicated - High bleeding risk  DVT prophylaxis - mechanical: SCDs

## 2017-03-14 NOTE — DISCHARGE INSTRUCTIONS
Discharge Instructions    Discharged to home by car with relative. Discharged via walking, hospital escort: Yes.  Special equipment needed: Not Applicable    Be sure to schedule a follow-up appointment with your primary care doctor or any specialists as instructed.     Discharge Plan:   Diet Plan: Discussed (Regular)  Activity Level: Discussed (As tolerated)  Confirmed Follow up Appointment: Patient to Call and Schedule Appointment  Confirmed Symptoms Management: Discussed  Medication Reconciliation Updated: Yes  Influenza Vaccine Indication: Not indicated: Previously immunized this influenza season and > 8 years of age    I understand that a diet low in cholesterol, fat, and sodium is recommended for good health. Unless I have been given specific instructions below for another diet, I accept this instruction as my diet prescription.   Other diet: Regular    Special Instructions: None    · Is patient discharged on Warfarin / Coumadin?   No     · Is patient Post Blood Transfusion?  No    Depression / Suicide Risk    As you are discharged from this [facility] facility, it is important to learn how to keep safe from harming yourself.    Recognize the warning signs:  · Abrupt changes in personality, positive or negative- including increase in energy   · Giving away possessions  · Change in eating patterns- significant weight changes-  positive or negative  · Change in sleeping patterns- unable to sleep or sleeping all the time   · Unwillingness or inability to communicate  · Depression  · Unusual sadness, discouragement and loneliness  · Talk of wanting to die  · Neglect of personal appearance   · Rebelliousness- reckless behavior  · Withdrawal from people/activities they love  · Confusion- inability to concentrate     If you or a loved one observes any of these behaviors or has concerns about self-harm, here's what you can do:  · Talk about it- your feelings and reasons for harming yourself  · Remove any means that  you might use to hurt yourself (examples: pills, rope, extension cords, firearm)  · Get professional help from the community (Mental Health, Substance Abuse, psychological counseling)  · Do not be alone:Call your Safe Contact- someone whom you trust who will be there for you.  · Call your local CRISIS HOTLINE 250-7501 or 100-519-0120  · Call your local Children's Mobile Crisis Response Team [place] (174) 941-8541 or www.Picket  · Call the toll free National Suicide Prevention Hotlines   · National Suicide Prevention Lifeline 502-637-FHZZ (5455)  · [employer] Line Network 800-SUICIDE (250-4188)    Activity: As tolerated.   Diet: regular   Followup:   -PCP 1 week   -GI referral outpatient   -GYN consult outpatient   Instructions:   -Given instructions to return to the ER if any new or worsening symptoms, worsening condition, or failure to improve   -Call PCP for followup   -No smoking, no alcohol, no caffeine   -Encourage risk factor reduction with tobacco and alcohol abstinence, diet changes, weight loss, and exercise.

## 2017-03-14 NOTE — PROGRESS NOTES
Pt arrived to  T214. Able to ambulate to bed by self. Assessed pt. A&O x4. C/o headache. Oriented to  and unit. POC discussed with pt. Bed in low position, call light within reach. Hourly rounding in place.

## 2017-03-14 NOTE — PROGRESS NOTES
IV Iron Per Pharmacy Note    Patient Lean Body Weight:  52 kg  Reason for Iron Replacement: iron deficiency anemia, symptomatic in the setting of menorrhagia x past year       Lab Results   Component Value Date/Time    WBC 4.5* 03/14/2017 03:30 AM    RBC 4.20 03/14/2017 03:30 AM    HEMOGLOBIN 7.6* 03/14/2017 03:30 AM    HEMATOCRIT 27.9* 03/14/2017 03:30 AM    MCV 66.4* 03/14/2017 03:30 AM    MCH 18.1* 03/14/2017 03:30 AM    MCHC 27.2* 03/14/2017 03:30 AM       Recent Labs     03/14/17   FERRITIN  2.5*   FOLATE  23.1   IRON  14*         Recent Labs      03/14/17   0330   CREATININE  0.47*          Assessment/Plan:  1. IV Iron Indicated.   2. Give Iron Dextran 25 mg IV test dose following diphenhydramine/acetaminophen premeds over 30 minutes per protocol.  3. If no reaction (Anaphylaxis, Hypotension/Hypertension, N/V/D, Chest pain/Back Pain, Urticaria/Pruritis) in the next hour, proceed to full dose. Nursing to call the pharmacy IV room at ext. [phone] for full dose.  4. Full dose: Iron Dextran 1400 mg IV over 4 hours. Continue to monitor for delayed ADR including: Arthralgia/myalgia, Headache/backache, chills/dizziness/malaise, moderate to high fever and n/v.      Helen Moss, PharmD

## 2017-03-14 NOTE — ED NOTES
Chief Complaint   Patient presents with   • Sent by MD     low h/h     Pt ambulated to triage, sent by 's office for low hemoglobin of 6. Pt feels dizzy and fatigue.

## 2017-03-14 NOTE — PROGRESS NOTES
Report received, assumed patient care.  Pt A&OX4.  Assessment completed.  Call light within reach, personal belongings available, bed in lowest position, pt calling for assistance.  Pt reports no pain.  Communication board updated, POC discussed.  VSS.  No additional needs at this time.

## 2017-03-14 NOTE — DISCHARGE SUMMARY
Hospital Medicine Discharge Note     Patient ID:  Felisha Martin  [id number]  31 y.o.female  1985    Admit Date:  3/13/2017       Discharge Date:   3/14/2017    Primary Care Provider: [facility]    Admitting Physician: Rashid Varela D.O.  Discharging Physician: Yolanda Martínez MD    Chief Complaint: anemia    Discharge Diagnoses:     Iron deficiency anemia due to chronic blood loss    Menorrhagia    Vitamin D deficiency    Chronic Medical Problems:  Past Medical History   Diagnosis Date   • Anemia    • Depression        Code Status: Full Code    Hospital Summary:       Please refer to H&P by Dr Armstrong on 3/13/2017 for full details.      In brief, Felisha Martin is a 31 y.o. female who was admitted 3/13/2017 for low hemoglobin while at the hematologists office. She was found to have low HGB by her PCP, who referred her to Dr Neal with hematology. Upon visit with Dr Neal she was found to have SOB upon exertion, palpitations, headaches, and lethargy for 6-12 months. She has not undergone a GI or GYN workup. She has been diagnosed with MAXIMINO, however has not tolerated her oral iron supplementation. She was placed under observation status and given a blood transfusion.    The patient underwent transfusion of 1 unit PRBC with stabilization of her HGB. She was found to be severely iron deficient, thus given an IV iron dextran infusion. This will replete her iron stores for 6 months. She has maintained stable vital signs. Upon observation she does not have any active source of bleeding. She has occasional dark stools. During her stay she has no occult GI bleeding. Her abdomen is soft and non-tender. Her vital signs have remained stable. Her symptoms have been chronic over the past 6-12 months. She needs OP followup with a GYN and GI doctor for workup of her anemia. She does describe very heavy periods, which may be a source of bleeding. We have discussed that she may benefit from OCP for her vaginal  bleeding, and is recommended to see a GYN for this.     Today, the patient is feeling at baseline. Her vitals have remained stable. She appears stable for discharge home. She has been provided vitamin supplementation as she was found to have a vitamin D deficiency. She has been set up to see our discharge clinic for close OP followup. She has been encouraged to return immediately for worsening symptoms, bloody stools, or worsening abdominal discomfort/pain.     Therefore, she is discharged in good and stable condition with close outpatient follow-up.    Consultants:      None    Studies:    Laboratory:   Recent Labs      03/13/17 1852 03/14/17 03/14/17   0330   WBC  4.2*  4.4*  4.5*   RBC  4.05*  3.85*  4.20   HEMOGLOBIN  6.7*  6.3*  7.6*   HEMATOCRIT  26.0*  24.3*  27.9*   MCV  64.2*  63.1*  66.4*   MCH  16.5*  16.4*  18.1*   MCHC  25.8*  25.9*  27.2*   RDW  48.7  47.3  56.9*   PLATELETCT  184  177  176       Recent Labs      03/13/17 1852 03/14/17 03/14/17   0330   SODIUM  139  138  139   POTASSIUM  3.6  3.5*  3.5*   CHLORIDE  107  110  111   CO2  25  23  23   GLUCOSE  105*  93  99   BUN  6*  6*  6*   CREATININE  0.48*  0.47*  0.47*   CALCIUM  8.9  8.3*  8.4*       Recent Labs      03/13/17 1852 03/14/17 03/14/17   0330   ALTSGPT  8  9  7   ASTSGOT  17  13  13   ALKPHOSPHAT  94  82  83   TBILIRUBIN  0.5  0.5  1.0   GLUCOSE  105*  93  99        Recent Labs      03/13/17 1852   TROPONINI  <0.01       Recent Labs     03/14/17   TSHULTRASEN  1.770     Vitamin B12- 244  Ferritin 2.5  Folic acid 23.1  Vitamin D 10  Iron 14, TIBC 510, Sat 3    Results     Procedure Component Value Units Date/Time    URINALYSIS [[id number]]  (Abnormal) Collected:  03/14/17 0822    Order Status:  Completed Specimen Information:  Urine from Urine, Clean Catch Updated:  03/14/17 0903     Micro Urine Req Microscopic      Color Yellow      Character Sl Cloudy (A)      Specific Gravity 1.013      Ph 6.5      Glucose Negative mg/dL       Ketones Negative mg/dL      Protein Negative mg/dL      Bilirubin Negative      Nitrite Negative      Leukocyte Esterase Large (A)      Occult Blood Small (A)         Procedures/Surgeries:        None    Disposition:  Discharge home    Condition:  Stable    Instructions:   Activity: As tolerated.  Diet: regular  Followup:   -PCP 1 week  -GI referral outpatient  -GYN consult outpatient  Instructions:  -Given instructions to return to the ER if any new or worsening symptoms, worsening condition, or failure to improve  -Call PCP for followup  -No smoking, no alcohol, no caffeine  -Encourage risk factor reduction with tobacco and alcohol abstinence, diet changes, weight loss, and exercise.   Future Appointments  Date Time Provider Department Center   3/15/2017 11:30 AM CARE MANAGER [facility]   3/16/2017 3:20 PM ANNELISE [email or web address] [facility]       Discharge Medications:           Medication List      START taking these medications       Instructions    Cholecalciferol 1000 UNIT Tabs   Commonly known as:  VITAMIND D3    Take 1 Tab by mouth every day.   Dose:  1000 Units       multivitamin Tabs   Last time this was given:  1 Tab on 3/14/2017  8:50 AM    Take 1 Tab by mouth every day.   Dose:  1 Tab       omeprazole 20 MG delayed-release capsule   Commonly known as:  PRILOSEC    Take 1 Cap by mouth 2 times a day. Take BID for 30 days then daily   Dose:  20 mg         CONTINUE taking these medications       Instructions    cyanocobalamin 500 MCG Tabs   Commonly known as:  VITAMIN B-12    Take 500 mcg by mouth every day.   Dose:  500 mcg       PRENATAL #2 PO    Take 1 Tab by mouth every day.   Dose:  1 Tab         STOP taking these medications          IRON SUPPLEMENT 325 (65 FE) MG tablet   Generic drug:  ferrous sulfate              Please CC the above physicians    ANNELISE [email or web address]  3/14/2017  2:53 PM

## 2017-03-14 NOTE — ED PROVIDER NOTES
ED Provider Note    Scribed for Cresencio Verde M.D. by Demar Mosquera. 3/13/2017, 8:47 PM.    Primary care provider: Pcp Not In Computer  Means of arrival: walk-in  History obtained from: patient  History limited by: none    CHIEF COMPLAINT  Chief Complaint   Patient presents with   • Sent by MD     low h/h       HPI  Felisha Martin is a 31 y.o. female who presents to the Emergency Department for evaluation of low H&H results. Per patient, she was at the doctor's today to follow up for thyroid problems and anemia. The patient had a finger poke that indicated a low RBC count, so her physician prompted her to come to the ED. The patient reports being anemic ever since she had her first child in 2007. The patient has occasional heavy periods, and she reports having intermittent dark stools since 2007.. She has never had a colonoscopy. The patient adds that she expereinces associated right abdominal pain, dizziness, fatigue, constant back pain, and intermittent chest pain. She adds that she is easily exacerbated with minimal physical activity. She has been evaluated for her chest pain with normal results. Patient reports taking Vitamin B, iron supplements, and a prenatal vitamins as an additional iron supplement daily. She denies taking other medications. She also denies pregnancy.      REVIEW OF SYSTEMS  Review of Systems   Constitutional: Positive for malaise/fatigue.   Cardiovascular: Positive for chest pain.   Gastrointestinal: Positive for abdominal pain, blood in stool and melena.   Genitourinary:        Positive heavy menstrual periods   Musculoskeletal: Positive for back pain.   Neurological: Positive for dizziness.   All other systems reviewed and are negative.    PAST MEDICAL HISTORY   has a past medical history of Anemia.    SURGICAL HISTORY  patient denies any surgical history    SOCIAL HISTORY  Social History   Substance Use Topics   • Smoking status: Never Smoker    • Smokeless tobacco: None   • Alcohol  "Use: Yes      History   Drug Use   • Yes     Comment: mj       FAMILY HISTORY  History reviewed. No pertinent family history.    CURRENT MEDICATIONS  Home Medications     Reviewed by Elyssa Elaine (Pharmacy Tech) on 03/13/17 at 2037  Med List Status: Complete    Medication Last Dose Status    cyanocobalamin (VITAMIN B-12) 500 MCG Tab 3/13/2017 Active    ferrous sulfate (IRON SUPPLEMENT) 325 (65 FE) MG tablet 3/13/2017 Active    Prenatal Multivit-Min-Fe-FA (PRENATAL #2 PO) 3/13/2017 Active                ALLERGIES  No Known Allergies    PHYSICAL EXAM  VITAL SIGNS: /69 mmHg  Pulse 81  Temp(Src) 37.2 °C (98.9 °F) (Temporal)  Resp 16  Ht 1.6 m (5' 3\")  Wt 71.5 kg (157 lb 10.1 oz)  BMI 27.93 kg/m2  SpO2 100%  LMP 03/13/2017  Vitals reviewed.  Constitutional: Well developed, Well nourished, No acute distress, pale appearance  HENT: Normocephalic, Atraumatic, Bilateral external ears normal, Oropharynx moist, No oral exudates, Nose normal.   Eyes: PERRL, EOMI, Conjunctiva normal, No discharge.   Neck: Normal range of motion, No tenderness, Supple, No stridor.   Cardiovascular: Normal heart rate, Normal rhythm, No murmurs, No rubs, No gallops.   Thorax & Lungs: Normal breath sounds, No respiratory distress, No wheezing, Left chest wall tenderness.   Abdomen: Bowel sounds normal, Soft, nontender, no peritonitis.  Skin: Warm, Dry, No erythema, No rash.   Musculoskeletal: Good range of motion in all major joints. No edema. No tenderness to palpation or major deformities noted.   Neurologic: Alert, Normal motor function, Normal sensory function, No focal deficits noted.   Psychiatric: Affect normal    LABS  Results for orders placed or performed during the hospital encounter of 03/13/17   CBC WITH DIFFERENTIAL   Result Value Ref Range    WBC 4.2 (L) 4.8 - 10.8 K/uL    RBC 4.05 (L) 4.20 - 5.40 M/uL    Hemoglobin 6.7 (L) 12.0 - 16.0 g/dL    Hematocrit 26.0 (L) 37.0 - 47.0 %    MCV 64.2 (L) 81.4 - 97.8 fL "    MCH 16.5 (L) 27.0 - 33.0 pg    MCHC 25.8 (L) 33.6 - 35.0 g/dL    RDW 48.7 35.9 - 50.0 fL    Platelet Count 184 164 - 446 K/uL    Nucleated RBC 0.00 /100 WBC    NRBC (Absolute) 0.00 K/uL    Neutrophils-Polys 78.90 (H) 44.00 - 72.00 %    Lymphocytes 19.30 (L) 22.00 - 41.00 %    Monocytes 1.80 0.00 - 13.40 %    Eosinophils 0.00 0.00 - 6.90 %    Basophils 0.00 0.00 - 1.80 %    Neutrophils (Absolute) 3.31 2.00 - 7.15 K/uL    Lymphs (Absolute) 0.81 (L) 1.00 - 4.80 K/uL    Monos (Absolute) 0.08 0.00 - 0.85 K/uL    Eos (Absolute) 0.00 0.00 - 0.51 K/uL    Baso (Absolute) 0.00 0.00 - 0.12 K/uL    Hypochromia 2+     Anisocytosis 2+     Microcytosis 2+    COMP METABOLIC PANEL   Result Value Ref Range    Sodium 139 135 - 145 mmol/L    Potassium 3.6 3.6 - 5.5 mmol/L    Chloride 107 96 - 112 mmol/L    Co2 25 20 - 33 mmol/L    Anion Gap 7.0 0.0 - 11.9    Glucose 105 (H) 65 - 99 mg/dL    Bun 6 (L) 8 - 22 mg/dL    Creatinine 0.48 (L) 0.50 - 1.40 mg/dL    Calcium 8.9 8.5 - 10.5 mg/dL    AST(SGOT) 17 12 - 45 U/L    ALT(SGPT) 8 2 - 50 U/L    Alkaline Phosphatase 94 30 - 99 U/L    Total Bilirubin 0.5 0.1 - 1.5 mg/dL    Albumin 4.2 3.2 - 4.9 g/dL    Total Protein 7.2 6.0 - 8.2 g/dL    Globulin 3.0 1.9 - 3.5 g/dL    A-G Ratio 1.4 g/dL   TROPONIN   Result Value Ref Range    Troponin I <0.01 0.00 - 0.04 ng/mL   APTT   Result Value Ref Range    APTT 33.2 24.7 - 36.0 sec   PROTHROMBIN TIME (INR)   Result Value Ref Range    PT 13.5 12.0 - 14.6 sec    INR 1.00 0.87 - 1.13   HCG QUAL SERUM   Result Value Ref Range    Beta-Hcg Qualitative Serum Negative Negative   COD (ADULT)   Result Value Ref Range    ABO Grouping Only O     Rh Grouping Only POS     Antibody Screen-Cod NEG    ESTIMATED GFR   Result Value Ref Range    GFR If African American >60 >60 mL/min/1.73 m 2    GFR If Non African American >60 >60 mL/min/1.73 m 2   DIFFERENTIAL MANUAL   Result Value Ref Range    Manual Diff Status PERFORMED    PERIPHERAL SMEAR REVIEW   Result Value Ref  Range    Peripheral Smear Review see below    PLATELET ESTIMATE   Result Value Ref Range    Plt Estimation Normal    MORPHOLOGY   Result Value Ref Range    RBC Morphology Present     Giant Platelets 2+     Polychromia 1+     Poikilocytosis 2+     Ovalocytes 2+     Schistocytes 1+    ABO CONFIRMATION   Result Value Ref Range    Second ABO Typing With Cod O       All labs reviewed by me.    EKG Interpretation  Interpreted by me    Rhythm:  Normal sinus rhythm   Rate: 71  Axis: normal  Ectopy: none  Conduction: normal  ST Segments: no acute change  T Waves: no acute change  Q Waves: none  Clinical Impression: Normal EKG without acute changes      COURSE & MEDICAL DECISION MAKING  Pertinent Labs & Imaging studies reviewed. (See chart for details)    8:47 PM Patient seen and examined at bedside. The patient presents with anemia, and the differential diagnosis includes but is not limited to anemia of chronic disease, iron deficiency anemia, chronic blood loss versus other.. Ordered for EKG, CBC with differential, CMP, Troponin, APTT, PTT (INR), HCG Qual Serum, COD, Estimated GFR, Differential manual, Peripheral smear review, Platelet estimate, morphology, ABO Confirmation to evaluate. I discussed the treatment plan as above with the patient. She understood and verbalized agreement.       The patient is never been worked up for admitted for this.  She's never had significant studies or colonoscopy.  She now symptomatically anemic with hemoglobin of 6.  For this reason she'll be admitted.  Spoke with the hospitalist will see the patient.  She is admitted in guarded condition.        FINAL IMPRESSION  1. Iron deficiency anemia due to chronic blood loss     2.  Symptomatic anemia     Demar CHOPRA), am scribing for, and in the presence of, Cresencio Verde M.D..    Electronically signed by: Demar Craven), 3/13/2017    Cresencio CHOPRA M.D. personally performed the services described in this  documentation, as scribed by Demar Mosquera in my presence, and it is both accurate and complete.    The note accurately reflects work and decisions made by me.  Cresencio Verde  3/14/2017  1:31 AM
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
## 2017-03-15 ENCOUNTER — PATIENT OUTREACH (OUTPATIENT)
Dept: HEALTH INFORMATION MANAGEMENT | Facility: OTHER | Age: 32
End: 2017-03-15

## 2017-03-15 PROBLEM — D50.9 IRON DEFICIENCY ANEMIA: Status: ACTIVE | Noted: 2017-03-15

## 2017-03-15 NOTE — PROGRESS NOTES
Pt discharged to home. IV d/c'd, pt armband removed. Pt and family understand written and verbal d/c instructions.  Prescriptions given.  Regular diet, activity as tolerated.  Pt to follow up with PCP, GI and OBGYN.  Pt verbalized understanding.

## 2017-03-16 ENCOUNTER — HOSPITAL ENCOUNTER (OUTPATIENT)
Dept: LAB | Facility: MEDICAL CENTER | Age: 32
End: 2017-03-16
Attending: NURSE PRACTITIONER
Payer: COMMERCIAL

## 2017-03-16 ENCOUNTER — OFFICE VISIT (OUTPATIENT)
Dept: MEDICAL GROUP | Facility: CLINIC | Age: 32
End: 2017-03-16
Payer: COMMERCIAL

## 2017-03-16 VITALS
OXYGEN SATURATION: 96 % | TEMPERATURE: 97.9 F | HEIGHT: 63 IN | BODY MASS INDEX: 27.29 KG/M2 | WEIGHT: 154 LBS | RESPIRATION RATE: 16 BRPM | SYSTOLIC BLOOD PRESSURE: 90 MMHG | DIASTOLIC BLOOD PRESSURE: 56 MMHG | HEART RATE: 80 BPM

## 2017-03-16 DIAGNOSIS — F32.0 MILD SINGLE CURRENT EPISODE OF MAJOR DEPRESSIVE DISORDER (HCC): ICD-10-CM

## 2017-03-16 DIAGNOSIS — G43.009 MIGRAINE WITHOUT AURA AND WITHOUT STATUS MIGRAINOSUS, NOT INTRACTABLE: ICD-10-CM

## 2017-03-16 DIAGNOSIS — Z09 HOSPITAL DISCHARGE FOLLOW-UP: ICD-10-CM

## 2017-03-16 DIAGNOSIS — D50.0 IRON DEFICIENCY ANEMIA DUE TO CHRONIC BLOOD LOSS: ICD-10-CM

## 2017-03-16 PROBLEM — F32.9 MAJOR DEPRESSIVE DISORDER: Status: ACTIVE | Noted: 2017-03-16

## 2017-03-16 LAB
ANISOCYTOSIS BLD QL SMEAR: ABNORMAL
BASOPHILS # BLD AUTO: 0.25 K/UL (ref 0–0.12)
BASOPHILS NFR BLD AUTO: 4.5 % (ref 0–1.8)
EOSINOPHIL # BLD: 0.05 K/UL (ref 0–0.51)
EOSINOPHIL NFR BLD AUTO: 0.9 % (ref 0–6.9)
ERYTHROCYTE [DISTWIDTH] IN BLOOD BY AUTOMATED COUNT: 56.7 FL (ref 35.9–50)
GIANT PLATELETS BLD QL SMEAR: NORMAL
HCT VFR BLD AUTO: 30.4 % (ref 37–47)
HGB BLD-MCNC: 8.1 G/DL (ref 12–16)
HYPOCHROMIA BLD QL SMEAR: ABNORMAL
LG PLATELETS BLD QL SMEAR: NORMAL
LYMPHOCYTES # BLD: 1.82 K/UL (ref 1–4.8)
LYMPHOCYTES NFR BLD AUTO: 33 % (ref 22–41)
MANUAL DIFF BLD: NORMAL
MCH RBC QN AUTO: 18.2 PG (ref 27–33)
MCHC RBC AUTO-ENTMCNC: 26.6 G/DL (ref 33.6–35)
MCV RBC AUTO: 68.2 FL (ref 81.4–97.8)
MICROCYTES BLD QL SMEAR: ABNORMAL
MONOCYTES # BLD: 0.05 K/UL (ref 0–0.85)
MONOCYTES NFR BLD AUTO: 0.9 % (ref 0–13.4)
MORPHOLOGY BLD-IMP: NORMAL
NEUTROPHILS # BLD: 3.34 K/UL (ref 2–7.15)
NEUTROPHILS NFR BLD AUTO: 60.7 % (ref 44–72)
NRBC # BLD AUTO: 0 K/UL
NRBC BLD-RTO: 0 /100 WBC
OVALOCYTES BLD QL SMEAR: NORMAL
PLATELET # BLD AUTO: 208 K/UL (ref 164–446)
PLATELET BLD QL SMEAR: NORMAL
POIKILOCYTOSIS BLD QL SMEAR: NORMAL
RBC # BLD AUTO: 4.46 M/UL (ref 4.2–5.4)
RBC BLD AUTO: PRESENT
SCHISTOCYTES BLD QL SMEAR: NORMAL
WBC # BLD AUTO: 5.5 K/UL (ref 4.8–10.8)

## 2017-03-16 PROCEDURE — 85007 BL SMEAR W/DIFF WBC COUNT: CPT

## 2017-03-16 PROCEDURE — 85027 COMPLETE CBC AUTOMATED: CPT

## 2017-03-16 PROCEDURE — 36415 COLL VENOUS BLD VENIPUNCTURE: CPT

## 2017-03-16 PROCEDURE — 99496 TRANSJ CARE MGMT HIGH F2F 7D: CPT | Performed by: NURSE PRACTITIONER

## 2017-03-16 RX ORDER — RIZATRIPTAN BENZOATE 5 MG/1
5 TABLET ORAL
Qty: 10 TAB | Refills: 3 | Status: SHIPPED | OUTPATIENT
Start: 2017-03-16 | End: 2018-11-08

## 2017-03-16 ASSESSMENT — ENCOUNTER SYMPTOMS
CONSTIPATION: 1
SHORTNESS OF BREATH: 0
EYE PAIN: 0
FEVER: 0
COUGH: 0
NERVOUS/ANXIOUS: 0
ABDOMINAL PAIN: 1
BLOOD IN STOOL: 0
WHEEZING: 0
PALPITATIONS: 0
NAUSEA: 1
DEPRESSION: 1
MYALGIAS: 0
BLURRED VISION: 0
VOMITING: 1
SPUTUM PRODUCTION: 0
WEAKNESS: 1
HEARTBURN: 0
HEADACHES: 0
CHILLS: 0
DIZZINESS: 1
FOCAL WEAKNESS: 0
FALLS: 0

## 2017-03-16 ASSESSMENT — PATIENT HEALTH QUESTIONNAIRE - PHQ9
SUM OF ALL RESPONSES TO PHQ QUESTIONS 1-9: 18
CLINICAL INTERPRETATION OF PHQ2 SCORE: 4
5. POOR APPETITE OR OVEREATING: 3 - NEARLY EVERY DAY

## 2017-03-16 NOTE — MR AVS SNAPSHOT
"        Felisha Martin   3/16/2017 3:20 PM   Office Visit   MRN: [id number]    Department:  [facility]   Dept Phone:  [phone]    Description:  Female : 1985   Provider:  CHANA Ceron           Reason for Visit     Hospital Follow-up nausea/ tingly from head to toe/occasiona dark stool/      Allergies as of 3/16/2017     No Known Allergies      You were diagnosed with     Hospital discharge follow-up   [207157]       Iron deficiency anemia due to chronic blood loss   [727650]       Mild single current episode of major depressive disorder (CMS-[facility])   [6869240]       Migraine without aura and without status migrainosus, not intractable   [701300]         Vital Signs     Blood Pressure Pulse Temperature Respirations Height Weight    90/56 mmHg 80 36.6 °C (97.9 °F) 16 1.6 m (5' 2.99\") 69.854 kg (154 lb)    Body Mass Index Oxygen Saturation Last Menstrual Period Smoking Status          27.29 kg/m2 96% 03/10/2017 Former Smoker        Basic Information     Date Of Birth Sex Race Ethnicity Preferred Language    1985 Female White  Origin (Slovenian,Cuban,British,Yordan, etc) English      Problem List              ICD-10-CM Priority Class Noted - Resolved    Iron deficiency anemia D50.9   3/15/2017 - Present    Major depressive disorder F32.9   3/16/2017 - Present      Health Maintenance        Date Due Completion Dates    IMM DTaP/Tdap/Td Vaccine (1 - Tdap) 2002 ---    PAP SMEAR 2004 ---    IMM INFLUENZA (1) 2016 ---            Current Immunizations     No immunizations on file.      Below and/or attached are the medications your provider expects you to take. Review all of your home medications and newly ordered medications with your provider and/or pharmacist. Follow medication instructions as directed by your provider and/or pharmacist. Please keep your medication list with you and share with your provider. Update the information when medications are " discontinued, doses are changed, or new medications (including over-the-counter products) are added; and carry medication information at all times in the event of emergency situations     Allergies:  No Known Allergies          Medications  Valid as of: March 16, 2017 -  4:10 PM    Generic Name Brand Name Tablet Size Instructions for use    Cholecalciferol (Tab) VITAMIND D3 1000 UNIT Take 1 Tab by mouth every day.        Cyanocobalamin (Tab) VITAMIN B-12 500 MCG Take 500 mcg by mouth every day.        Multiple Vitamin (Tab) THERAGRAN  Take 1 Tab by mouth every day.        Omeprazole (CAPSULE DELAYED RELEASE) PRILOSEC 20 MG Take 1 Cap by mouth 2 times a day. Take BID for 30 days then daily        Prenatal Multivit-Min-Fe-FA   Take 1 Tab by mouth every day.        Rizatriptan Benzoate (Tab) MAXALT 5 MG Take 1 Tab by mouth Once PRN for Migraine for up to 1 dose. 1-2 tablets every 2 hours as needed, max 30 mg daily.        .                 Medicines prescribed today were sent to:     [facility] [address] [place] [address] [address]  AT [place] [address] & [address]    [address] [facility] [address]    Phone: [phone] Fax: [phone]    Open 24 Hours?: No      Medication refill instructions:       If your prescription bottle indicates you have medication refills left, it is not necessary to call your provider’s office. Please contact your pharmacy and they will refill your medication.    If your prescription bottle indicates you do not have any refills left, you may request refills at any time through one of the following ways: The online [employer] system (except Urgent Care), by calling your provider’s office, or by asking your pharmacy to contact your provider’s office with a refill request. Medication refills are processed only during regular business hours and may not be available until the next business day. Your provider may request additional information or to have a follow-up visit  with you prior to refilling your medication.   *Please Note: Medication refills are assigned a new Rx number when refilled electronically. Your pharmacy may indicate that no refills were authorized even though a new prescription for the same medication is available at the pharmacy. Please request the medicine by name with the pharmacy before contacting your provider for a refill.        Your To Do List     Future Labs/Procedures Complete By Expires    CBC WITH DIFFERENTIAL  As directed 3/16/2018      Referral     A referral request has been sent to our patient care coordination department. Please allow 3-5 business days for us to process this request and contact you either by phone or mail. If you do not hear from us by the 5th business day, please call us at [phone].        Instructions    If you need further assistance, or have any questions; concerns or lingering symptoms before seeing your Primary Care Provider or specialist.     Do not hesitate to contact us.     Please contact us at the Post-Hospital Follow Up Program at [phone].   Our offices hours are Monday-Friday 8 am-5 pm.                MyChart Status: Patient Declined

## 2017-03-16 NOTE — PROGRESS NOTES
POST DISCHARGE CALL MADE BY Vidhya Khan  Discharge Date:3/14/2017   Date of Outreach Call: 3/15/2017 11:30 AM  Now that you're home, how are you doing? Fair  Comment:Pt states that her feet are very achy and  tingly.  She states her back hurts and she has chest  soreness.  Pt needs a note stating that she shas been  released to work.  Pt was discharged from the CDU at [facility].  Do you have questions about your medications? No    Did you fill your medications? No  Comment:Pt states she will fill her prescriptions this  afternoon.    Do you have a follow-up appointment scheduled?Yes  Comment:Pt will f/u at discharge clinic 3/16 at 3:20 PM.  She states that she has transportation.  Instructed pt on  location of [place] .  Pt has also been instructed to f/u  with her GI provider and GYN.    Discharging Department: Emergency Department [facility]    Number of Attempts: 1  Current or previous attempts completed within two business days of discharge? Yes  Provided education regarding treatment plan, medication, self-management, ADLs? No  Comment:Pt states she understands discharge  instructions, has no questions.  Has patient completed Advance Directive? If yes, advise them to bring to appointment. No  Care Manager phone number provided? Yes  Comment:Amy at [phone]  Is there anything else I can help you with? No

## 2017-03-16 NOTE — PATIENT INSTRUCTIONS
If you need further assistance, or have any questions; concerns or lingering symptoms before seeing your Primary Care Provider or specialist.     Do not hesitate to contact us.     Please contact us at the Post-Hospital Follow Up Program at [phone].   Our offices hours are Monday-Friday 8 am-5 pm.

## 2017-03-16 NOTE — PROGRESS NOTES
Subjective:     Felisha Martin is a 31 y.o. female who presents for Hospital Follow-up.  Chart reviewed. Discharge summary available for review: Yes   Date of discharge 3/14/2017.  48- hour post discharge RN call completed on 3/15/2017 and documented in the medical record by Vidhya Khan.    HPI: Recently hospitalized for anemia, sob, palpitation, headache and lethrgic, found to have iron deficiency anemia, negativ eoccult blood in the hospital, received IV iron and transfusion, hospitalized from 3/13 to 3/14. Outpaitent GI and gynoconogist follow up are recoomended given her ocasional dark stools and vaginal bleeding.     Since returning home, patient reports feeling tired and some tingling/wierd sensation to her hands and feet. Pt reports that she had another episode of BM with blood (dark brown with red blood). She reports that she has hemorrhoid for a long time and she has been having intermittent stool with blood for years but she has been feeling ok until recently that she feels weak, dizziness and some numbness and tingling sensation. She denied vaginal bleeding. Pt reports that she is not taking any medications until she reports that she is suffering from headache for about 2 years. She reports that she only takes prn tylenol or ibuprofen as needed (ibuprofen more than tylenol) Last ibuprofen was yesterday, two pills (600mg x 2 each time) each time, average two times per week for about 2 years. She was prescribed with omeprazole from hospital but she has not filled the medication.     Headache Red Flags   1. New headaches: No . Started having headache about couple years ago, recently getting worse.  2. Significant change in headache patterns or characteristics (increasing frequency or severity): No . Headache usually starts next to her right side of head/eye then goes to her middle of front head then go all the way back to her back of head and neck, pressure pain, no change characteristic recently. Pt  gets headache every day, lasting from 45 minutes to 2 hours but she would try to deal with it on her own without taking medications. Average taking ibuprofen 2 times per week.   3. Beginning after the age of 50: No   4. Worrisome associated features: No ; impaired consciousness No ; focal weakness No   5. Systemic illness (cancer, HIV, other immunosuppression): No   6. Systemic symptoms: Fever No ; Stiff neck No ; weight loss No 190-154 lbs in three year periods  7. Rapid onset of headache: [place] No   8. Headache secondary to head trauma: No     Future appointment: She has an appointment with PCP but she does not remember when.     Allergies:   Review of patient's allergies indicates no known allergies.    Social History:  Social History   Substance Use Topics   • Smoking status: Former Smoker   • Smokeless tobacco: Never Used   • Alcohol Use: 0.0 oz/week     0 Standard drinks or equivalent per week      Comment: 4-5 shots 2 days a week        ROS:  Review of Systems   Constitutional: Positive for malaise/fatigue. Negative for fever and chills.   HENT: Negative for hearing loss and tinnitus.    Eyes: Negative for blurred vision and pain.   Respiratory: Negative for cough, sputum production, shortness of breath and wheezing.    Cardiovascular: Negative for chest pain, palpitations and leg swelling.   Gastrointestinal: Positive for nausea, vomiting, abdominal pain (lower abdomen) and constipation ( hard BM x 2, first one like bashir, with hard ball coming out with dark red, then second one greenish softer BM ). Negative for heartburn, blood in stool and melena.   Genitourinary: Negative for dysuria, urgency and frequency.   Musculoskeletal: Negative for myalgias and falls.   Skin: Negative for rash.   Neurological: Positive for dizziness and weakness (but no difficulty taking care of self). Negative for focal weakness and headaches.   Psychiatric/Behavioral: Positive for depression ( pt reports that she has  "depression for more than 5 years. She took lexapro before and seen psychiatrist before but then she stops because she has learn how to deal with her life situation. She has not taken medications for at least 4 years and doing fine). Negative for suicidal ideas. The patient is not nervous/anxious.         Objective:     Blood pressure 90/56, pulse 80, temperature 36.6 °C (97.9 °F), resp. rate 16, height 1.6 m (5' 2.99\"), weight 69.854 kg (154 lb), last menstrual period 03/10/2017, SpO2 96 %.     Physical Exam:  Physical Exam   Constitutional: She is oriented to person, place, and time and well-developed, well-nourished, and in no distress.   HENT:   Head: Normocephalic and atraumatic.   Eyes: Conjunctivae are normal.   Neck: Neck supple. No JVD present. No thyromegaly present.   Cardiovascular: Normal rate and regular rhythm.    No murmur heard.  Pulmonary/Chest: Effort normal and breath sounds normal. No respiratory distress. She has no wheezes.   Abdominal: Soft. Bowel sounds are normal. She exhibits no distension. There is no tenderness.   Musculoskeletal: Normal range of motion. She exhibits no edema.   Neurological: She is alert and oriented to person, place, and time. No cranial nerve deficit. Gait normal.   Skin: Skin is warm. No erythema.   Nursing note and vitals reviewed.        Assessment and Plan:     1. Hospital discharge follow-up  Hospitalization and results reviewed with patient. High risk conditions requiring teaching or care coordination were identified and addressed.The patient demonstrate understanding of admission and underlying conditions. The patient understands discharge instructions and when to seek medical attention. Medications reviewed including instructions regarding high risk medications, dosing and side effects.    The patient is able to safely adhere to ADL/IADL, treatment and medication regimen, self-manage of high-risk conditions? Yes   The patient requires physical therapy/home " "health/DME referral? No   The patient requires referral to care coordination/behavioral health/social work?  No   Patient requires referral for pharmacy consult? No   Advance directive/POLST on file?  No   Required counseled on advance directive?  No       2. Iron deficiency anemia due to chronic blood loss  - s/p IV iron in the hospital but still having episode of GI bleeding.   - REFERRAL TO GASTROENTEROLOGY  - CBC WITH DIFFERENTIAL; Future  - Instructed pt to stop taking ibuprofen AND must go fill omeprazole. She verbalized understanding.   - work note given to pt.    3. Mild single current episode of major depressive disorder (CMS-HCC)  - Patient has been identified as being depressed and appropriate orders and counseling have been given  - Pt reports she does not feel her depression is a big deal, \"It is just me\" and she feels she can deal with this without medication. She declined medication or psychiatrist referral at this time and she will follow up with her PCP    4. Migraine without aura and without status migrainosus, not intractable  - rizatriptan (MAXALT) 5 MG tablet; Take 1 Tab by mouth Once PRN for Migraine for up to 1 dose. 1-2 tablets every 2 hours as needed, max 30 mg daily.  Dispense: 10 Tab; Refill: 3  - Follow up with PCP for prophylaxis medication if indicated.      Medication Reconciliation  Medication list at end of encounter:   Current Outpatient Prescriptions   Medication Sig Dispense Refill   • rizatriptan (MAXALT) 5 MG tablet Take 1 Tab by mouth Once PRN for Migraine for up to 1 dose. 1-2 tablets every 2 hours as needed, max 30 mg daily. 10 Tab 3   • multivitamin (THERAGRAN) Tab Take 1 Tab by mouth every day. 30 Tab    • vitamin D (VITAMIND D3) 1000 UNIT Tab Take 1 Tab by mouth every day. 30 Tab 2   • omeprazole (PRILOSEC) 20 MG delayed-release capsule Take 1 Cap by mouth 2 times a day. Take BID for 30 days then daily 60 Cap 3   • Prenatal Multivit-Min-Fe-FA (PRENATAL #2 PO) Take 1 Tab by " mouth every day.     • cyanocobalamin (VITAMIN B-12) 500 MCG Tab Take 500 mcg by mouth every day.       No current facility-administered medications for this visit.       Primary care follow-up:  New health conditions identified during hospitalization? Yes   Labs/pathology/imaging requires future PCP follow-up?  No   Changes to medications during hospitalization or today? No     Recommended followup: No Follow-up on file. with Pcp Not In Computer       Patient Instruction  Patient offered educational material on discharge diagnosis and management of symptoms/red flags. Patient instructed to keep follow-up appointments and to bring written questions and and actual medications to each office visit. Patient instructed to call PCP/specialist with any problems/questions/concerns. Patient verbalizes understanding and has no further questions at this time.    Face-to-face transitional care management services with high complexity medical decision making.

## 2017-03-16 NOTE — Clinical Note
March 16, 2017    To Whom It May Concern:         This is confirmation that Felisha Jamaay attended her scheduled appointment with CHANA Ceron on 3/16/17.         If you have any questions please do not hesitate to call me at the phone number listed below.    Sincerely,          GREY Ceron.  [phone]

## 2017-03-18 ENCOUNTER — APPOINTMENT (OUTPATIENT)
Dept: ONCOLOGY | Facility: MEDICAL CENTER | Age: 32
End: 2017-03-18
Attending: INTERNAL MEDICINE
Payer: MEDICAID

## 2017-03-18 LAB — EKG IMPRESSION: NORMAL

## 2017-03-20 DIAGNOSIS — D50.9 IRON DEFICIENCY ANEMIA: ICD-10-CM

## 2017-03-20 DIAGNOSIS — D50.0 IRON DEFICIENCY ANEMIA DUE TO CHRONIC BLOOD LOSS: ICD-10-CM

## 2017-03-20 RX ORDER — FERROUS SULFATE 325(65) MG
325 TABLET ORAL DAILY
Qty: 30 TAB | Refills: 2 | Status: SHIPPED | OUTPATIENT
Start: 2017-03-20 | End: 2018-11-08

## 2017-03-21 ENCOUNTER — TELEPHONE (OUTPATIENT)
Dept: MEDICAL GROUP | Facility: CLINIC | Age: 32
End: 2017-03-21

## 2017-03-21 NOTE — TELEPHONE ENCOUNTER
Patient called back and was informed on provider's message below regarding test results, iron supplement,  And stop taking ibuprofen.    Outcome: Patient voiced understanding and agreed to provider's recommendations.   Patient stated she scheduled to see GI on 03/22/17.

## 2017-03-21 NOTE — TELEPHONE ENCOUNTER
----- Message from CHANA Ceron sent at 3/20/2017 10:34 AM PDT -----  Result reviewed. Please call patient to let her know that her hgb is stable but still low. Even though she has IV iron infusion in the hospital but oral iron supplement is still recommended since it also takes few weeks to up to it effectiveness. I already placed oral iron supplement order to her pharmacy. Make sure that she stop taking ibuprofen and ask her if she gets the appointment with GI or not.    Thanks.  RAVI Connolly)

## 2017-03-22 ENCOUNTER — HOSPITAL ENCOUNTER (OUTPATIENT)
Dept: LAB | Facility: MEDICAL CENTER | Age: 32
End: 2017-03-22
Attending: SPECIALIST
Payer: COMMERCIAL

## 2017-03-22 PROCEDURE — 87591 N.GONORRHOEAE DNA AMP PROB: CPT

## 2017-03-22 PROCEDURE — 87491 CHLMYD TRACH DNA AMP PROBE: CPT

## 2017-03-22 PROCEDURE — 88175 CYTOPATH C/V AUTO FLUID REDO: CPT

## 2017-03-22 PROCEDURE — 87624 HPV HI-RISK TYP POOLED RSLT: CPT

## 2017-03-22 PROCEDURE — 88305 TISSUE EXAM BY PATHOLOGIST: CPT

## 2017-03-23 LAB
C TRACH DNA GENITAL QL NAA+PROBE: NEGATIVE
CYTOLOGY REG CYTOL: ABNORMAL
HPV HR 12 DNA CVX QL NAA+PROBE: POSITIVE
HPV16 DNA SPEC QL NAA+PROBE: NEGATIVE
HPV18 DNA SPEC QL NAA+PROBE: NEGATIVE
N GONORRHOEA DNA GENITAL QL NAA+PROBE: NEGATIVE
SPECIMEN SOURCE: ABNORMAL
SPECIMEN SOURCE: ABNORMAL

## 2017-04-24 ENCOUNTER — TELEPHONE (OUTPATIENT)
Dept: RADIOLOGY | Facility: MEDICAL CENTER | Age: 32
End: 2017-04-24

## 2017-11-02 ENCOUNTER — OFFICE VISIT (OUTPATIENT)
Dept: URGENT CARE | Facility: PHYSICIAN GROUP | Age: 32
End: 2017-11-02
Payer: MEDICAID

## 2017-11-02 VITALS
RESPIRATION RATE: 16 BRPM | BODY MASS INDEX: 27.9 KG/M2 | TEMPERATURE: 98 F | WEIGHT: 163.4 LBS | SYSTOLIC BLOOD PRESSURE: 128 MMHG | DIASTOLIC BLOOD PRESSURE: 78 MMHG | OXYGEN SATURATION: 98 % | HEART RATE: 84 BPM | HEIGHT: 64 IN

## 2017-11-02 DIAGNOSIS — I88.9 LYMPHADENITIS: Primary | ICD-10-CM

## 2017-11-02 DIAGNOSIS — J02.9 SORE THROAT: ICD-10-CM

## 2017-11-02 LAB
INT CON NEG: NEGATIVE
INT CON POS: POSITIVE
S PYO AG THROAT QL: NEGATIVE

## 2017-11-02 PROCEDURE — 87880 STREP A ASSAY W/OPTIC: CPT | Performed by: PHYSICIAN ASSISTANT

## 2017-11-02 PROCEDURE — 99203 OFFICE O/P NEW LOW 30 MIN: CPT | Performed by: PHYSICIAN ASSISTANT

## 2017-11-02 RX ORDER — IBUPROFEN 800 MG/1
800 TABLET ORAL EVERY 8 HOURS PRN
COMMUNITY

## 2017-11-02 RX ORDER — AMOXICILLIN AND CLAVULANATE POTASSIUM 875; 125 MG/1; MG/1
1 TABLET, FILM COATED ORAL 2 TIMES DAILY
Qty: 20 TAB | Refills: 0 | Status: SHIPPED | OUTPATIENT
Start: 2017-11-02 | End: 2017-11-12

## 2017-11-02 RX ORDER — TRAMADOL HYDROCHLORIDE 50 MG/1
50 TABLET ORAL EVERY 6 HOURS PRN
Qty: 15 TAB | Refills: 0 | Status: SHIPPED | OUTPATIENT
Start: 2017-11-02 | End: 2018-11-08

## 2017-11-03 NOTE — PROGRESS NOTES
Chief Complaint   Patient presents with   • Otalgia     Bilateral fullness, pain, swollen glands       HISTORY OF PRESENT ILLNESS: Patient is a 32 y.o. female who presents today becauseShe has a 3 to four-day history of significant bilateral anterior cervical (enlargement and tenderness. Sore throat, bilateral ear pain. Has been going on for 3 or 4 days. She denies any fevers, chills, nausea, vomiting or diarrhea. She has been taking over-the-counter anti-inflammatories, and nothing seems to be helping.    Patient Active Problem List    Diagnosis Date Noted   • Major depressive disorder 03/16/2017   • Iron deficiency anemia 03/15/2017       Allergies:Review of patient's allergies indicates no known allergies.    Current Outpatient Prescriptions Ordered in [facility]   Medication Sig Dispense Refill   • ibuprofen (MOTRIN) 800 MG Tab Take 800 mg by mouth every 8 hours as needed.     • amoxicillin-clavulanate (AUGMENTIN) 875-125 MG Tab Take 1 Tab by mouth 2 times a day for 10 days. 20 Tab 0   • tramadol (ULTRAM) 50 MG Tab Take 1 Tab by mouth every 6 hours as needed. 15 Tab 0   • ferrous sulfate 325 (65 FE) MG tablet Take 1 Tab by mouth every day. 30 Tab 2   • rizatriptan (MAXALT) 5 MG tablet Take 1 Tab by mouth Once PRN for Migraine for up to 1 dose. 1-2 tablets every 2 hours as needed, max 30 mg daily. 10 Tab 3   • multivitamin (THERAGRAN) Tab Take 1 Tab by mouth every day. 30 Tab    • vitamin D (VITAMIND D3) 1000 UNIT Tab Take 1 Tab by mouth every day. 30 Tab 2   • omeprazole (PRILOSEC) 20 MG delayed-release capsule Take 1 Cap by mouth 2 times a day. Take BID for 30 days then daily 60 Cap 3   • Prenatal Multivit-Min-Fe-FA (PRENATAL #2 PO) Take 1 Tab by mouth every day.     • cyanocobalamin (VITAMIN B-12) 500 MCG Tab Take 500 mcg by mouth every day.       No current [facility]-ordered facility-administered medications on file.        Past Medical History:   Diagnosis Date   • Anemia    • Depression        Social History  "  Substance Use Topics   • Smoking status: Never Smoker   • Smokeless tobacco: Never Used   • Alcohol use No      Comment: 4-5 shots 2 days a week       Family Status   Relation Status   • Sister    • Maternal Aunt      Family History   Problem Relation Age of Onset   • Diabetes Sister      type I    • Breast Cancer Maternal Aunt        ROS:  Review of Systems   Constitutional: Negative for fever, chills, weight loss and malaise/fatigue.   HENT: Positive for ear pain, no nosebleeds, congestion, positive for sore throat and neck pain.    Eyes: Negative for blurred vision.   Respiratory: Negative for cough, sputum production, shortness of breath and wheezing.    Cardiovascular: Negative for chest pain, palpitations, orthopnea and leg swelling.   Gastrointestinal: Negative for heartburn, nausea, vomiting and abdominal pain.   Genitourinary: Negative for dysuria, urgency and frequency.     Exam:  Blood pressure 128/78, pulse 84, temperature 36.7 °C (98 °F), resp. rate 16, height 1.626 m (5' 4\"), weight 74.1 kg (163 lb 6.4 oz), last menstrual period 11/01/2017, SpO2 98 %, not currently breastfeeding.  General:  Well nourished, well developed female in NAD  Head:Normocephalic, atraumatic  Eyes: PERRLA, EOM within normal limits, no conjunctival injection, no scleral icterus, visual fields and acuity grossly intact.  Ears: Normal shape and symmetry, no tenderness, no discharge. External canals are without any significant edema or erythema. Tympanic membranes are without any inflammation, moderate amount of cloudy fluid behind the tympanic membranes bilaterally. Gross auditory acuity is intact  Nose: Symmetrical without tenderness, no discharge.  Mouth: reasonable hygiene, she has significant pharyngeal arch erythema without exudates or tonsillar enlargement.  Neck: Significant bilateral anterior cervical node enlargement and tenderness, range of motion within normal limits, no tracheal deviation. No obvious thyroid " enlargement.  Pulmonary: chest is symmetrical with respiration, no wheezes, crackles, or rhonchi.  Cardiovascular: regular rate and rhythm without murmurs, rubs, or gallops.  Extremities: no clubbing, cyanosis, or edema.    Strep test is negative    Please note that this dictation was created using voice recognition software. I have made every reasonable attempt to correct obvious errors, but I expect that there are errors of grammar and possibly content that I did not discover before finalizing the note.    Assessment/Plan:  1. Lymphadenitis  amoxicillin-clavulanate (AUGMENTIN) 875-125 MG Tab    tramadol (ULTRAM) 50 MG Tab   2. Sore throat  POCT Rapid Strep A    tramadol (ULTRAM) 50 MG Tab       Followup with primary care in the next 7-10 days if not significantly improving, return to the urgent care or go to the emergency room sooner for any worsening of symptoms.

## 2018-08-20 ENCOUNTER — OFFICE VISIT (OUTPATIENT)
Dept: URGENT CARE | Facility: PHYSICIAN GROUP | Age: 33
End: 2018-08-20
Payer: MEDICAID

## 2018-08-20 VITALS
BODY MASS INDEX: 26.75 KG/M2 | WEIGHT: 151 LBS | SYSTOLIC BLOOD PRESSURE: 110 MMHG | OXYGEN SATURATION: 97 % | TEMPERATURE: 98.2 F | RESPIRATION RATE: 16 BRPM | HEIGHT: 63 IN | DIASTOLIC BLOOD PRESSURE: 72 MMHG | HEART RATE: 103 BPM

## 2018-08-20 DIAGNOSIS — H11.003 PTERYGIUM OF BOTH EYES: ICD-10-CM

## 2018-08-20 DIAGNOSIS — T15.92XA FOREIGN BODY, EYE, LEFT, INITIAL ENCOUNTER: ICD-10-CM

## 2018-08-20 DIAGNOSIS — G43.909 MIGRAINE SYNDROME: ICD-10-CM

## 2018-08-20 DIAGNOSIS — R21 RASH: ICD-10-CM

## 2018-08-20 DIAGNOSIS — L23.0 ALLERGIC CONTACT DERMATITIS DUE TO METALS: ICD-10-CM

## 2018-08-20 PROCEDURE — 99214 OFFICE O/P EST MOD 30 MIN: CPT | Performed by: FAMILY MEDICINE

## 2018-08-20 RX ORDER — LORATADINE 10 MG/1
10 TABLET ORAL DAILY
Qty: 10 TAB | Refills: 0 | Status: SHIPPED | OUTPATIENT
Start: 2018-08-20 | End: 2018-11-08

## 2018-08-20 RX ORDER — METHYLPREDNISOLONE 4 MG/1
TABLET ORAL
Qty: 21 TAB | Refills: 0 | Status: SHIPPED | OUTPATIENT
Start: 2018-08-20 | End: 2018-11-08

## 2018-08-20 ASSESSMENT — ENCOUNTER SYMPTOMS
VOMITING: 0
CARDIOVASCULAR NEGATIVE: 1
EYE REDNESS: 1
BRUISES/BLEEDS EASILY: 0
HEADACHES: 0
MUSCULOSKELETAL NEGATIVE: 1
PHOTOPHOBIA: 0
CHILLS: 0
DOUBLE VISION: 0
FEVER: 0
NAUSEA: 0
SORE THROAT: 0
DIZZINESS: 0
EYE DISCHARGE: 0
EYE PAIN: 1
PSYCHIATRIC NEGATIVE: 1
BLURRED VISION: 0
DIAPHORESIS: 0
RESPIRATORY NEGATIVE: 1

## 2018-08-20 NOTE — LETTER
Pterygium Introduction  Pterygium is a noncancerous (benign) fleshy growth on the front surface of the eye. A pterygium can start on the clear outer tissue of the eye (conjunctiva). It spreads to cover the white part of the eye (sclera) and extends onto the clear tissue that covers the pupil (cornea).    Pterygium Excision  You may need a surgery if you have a pterygium that is causing discomfort or affecting your vision. Pterygium excision may be needed if other treatments are not working. You may also choose to have this surgery to improve the appearance of your eye (cosmetic surgery).  Tell a health care provider about:  · Any allergies you have.  · All medicines you are taking, including vitamins, herbs, eye drops, creams, and over-the-counter medicines.  · Any problems you or family members have had with anesthetic medicines.  · Any blood disorders you have.  · Any surgeries you have had.  · Any medical conditions you have.  What are the risks?  Generally, this is a safe procedure. However, problems may occur, including:  · Having the pterygium come back after surgery.  · Eye pain.  · Vision changes (astigmatism).  · Feeling like there is something in your eye.  · Scar formation on the eye (granuloma).  What happens before the procedure?  · Ask your health care provider about:  ¨ Changing or stopping your regular medicines. This is especially important if you are taking diabetes medicines or blood thinners.  ¨ Taking medicines such as aspirin and ibuprofen. These medicines can thin your blood. Do not take these medicines before your procedure if your health care provider instructs you not to.  · Plan to have someone take you home after the procedure.  · If you go home right after the procedure, plan to have someone with you for 24 hours.  What happens during the procedure?  · An IV tube will be inserted into one of your veins.  · You will be given one or more of the following:  ¨ A medicine that helps you relax  (sedative).  ¨ A medicine that numbs the area (local anesthetic).  · After your eye is numb, your health care provider will use a device (retractor) to hold your eyelid open.  · The pterygium will be removed and lifted away from your eye.  · You may have a piece of eye tissue (graft) attached to the surface of your eye where the pterygium was removed. This graft may be taken from the conjunctiva at the outer lining of your eye on the side that is away from your pterygium excision.  · The graft may be held in place with tiny absorbable stitches (sutures) or with a type of glue.  · Your eye will be closed, and an eye patch will be placed over your eye.  The procedure may vary among health care providers and hospitals.  What happens after the procedure?  · Your blood pressure, heart rate, breathing rate, and blood oxygen level will be monitored often until the medicines you were given have worn off.  · You will be given pain medicine as needed.  · You will need to wear your eye patch as directed by your health care provider.  This information is not intended to replace advice given to you by your health care provider. Make sure you discuss any questions you have with your health care provider.  Document Released: 09/12/2002 Document Revised: 05/25/2017 Document Reviewed: 09/02/2015  © 2017 [employer]

## 2018-08-20 NOTE — LETTER
August 20, 2018         Patient: Felisha Martin   YOB: 1985   Date of Visit: 8/20/2018           To Whom it May Concern:    Felisha Martin was seen in my clinic on 8/20/2018. Please excuse patient from work due to illness today.      If you have any questions or concerns, please don't hesitate to call.        Sincerely,           Meagan Dawn D.O.  Electronically Signed

## 2018-08-21 NOTE — PROGRESS NOTES
Subjective:      Felisha Martin is a 33 y.o. female who presents with Rash (started yesterday) and Eye Problem (feels like she has a hair it it)    Patient presents to urgent care with acute onset of multiple problems.    1.  Yesterday she started with a rash on her chest that has been progressing up her neck it is itchy she denies any difficulty breathing tongue swelling shortness of breath.  She denies any new cosmetic detergents or food.  She is not taking any medication for it thus far.    2.  She is also felt that her left eye has had hair in it over the past 24 hours.  She denies any visual disturbances.  No discharge no fevers or chills.    3.  She also has been seeing an eye doctor in [place] for this overgrowth of tissue that is going on in her right eye and now she feels that is starting in her left eye.  No pain to this.    4. H/o migraines have been stable onmedication no headaches no n, v    HPI  Review of Systems   Constitutional: Negative for chills, diaphoresis, fever and malaise/fatigue.   HENT: Negative for sore throat.    Eyes: Positive for pain and redness. Negative for blurred vision, double vision, photophobia and discharge.   Respiratory: Negative.    Cardiovascular: Negative.    Gastrointestinal: Negative for nausea and vomiting.   Genitourinary: Negative.    Musculoskeletal: Negative.    Skin: Positive for itching and rash.   Neurological: Negative for dizziness and headaches.   Endo/Heme/Allergies: Does not bruise/bleed easily.   Psychiatric/Behavioral: Negative.    All other systems reviewed and are negative.    PMH:  has a past medical history of Anemia and Depression.  MEDS:   Current Outpatient Prescriptions:   •  ibuprofen (MOTRIN) 800 MG Tab, Take 800 mg by mouth every 8 hours as needed., Disp: , Rfl:   •  tramadol (ULTRAM) 50 MG Tab, Take 1 Tab by mouth every 6 hours as needed., Disp: 15 Tab, Rfl: 0  •  ferrous sulfate 325 (65 FE) MG tablet, Take 1 Tab by mouth every day., Disp: 30  "Tab, Rfl: 2  •  rizatriptan (MAXALT) 5 MG tablet, Take 1 Tab by mouth Once PRN for Migraine for up to 1 dose. 1-2 tablets every 2 hours as needed, max 30 mg daily., Disp: 10 Tab, Rfl: 3  •  multivitamin (THERAGRAN) Tab, Take 1 Tab by mouth every day., Disp: 30 Tab, Rfl:   •  vitamin D (VITAMIND D3) 1000 UNIT Tab, Take 1 Tab by mouth every day., Disp: 30 Tab, Rfl: 2  •  omeprazole (PRILOSEC) 20 MG delayed-release capsule, Take 1 Cap by mouth 2 times a day. Take BID for 30 days then daily, Disp: 60 Cap, Rfl: 3  •  Prenatal Multivit-Min-Fe-FA (PRENATAL #2 PO), Take 1 Tab by mouth every day., Disp: , Rfl:   •  cyanocobalamin (VITAMIN B-12) 500 MCG Tab, Take 500 mcg by mouth every day., Disp: , Rfl:   ALLERGIES: No Known Allergies  SURGHX:   Past Surgical History:   Procedure Laterality Date   • CHOLECYSTECTOMY     SOCHX:  reports that she has never smoked. She has never used smokeless tobacco. She reports that she does not drink alcohol or use drugs.  FH: Family history was reviewed, no pertinent findings to report   Objective:     /72   Pulse (!) 103   Temp 36.8 °C (98.2 °F)   Resp 16   Ht 1.6 m (5' 3\")   Wt 68.5 kg (151 lb)   LMP 07/30/2018   SpO2 97%   BMI 26.75 kg/m²      Physical Exam   Constitutional: She is oriented to person, place, and time. She appears well-developed and well-nourished. No distress.   HENT:   Mouth/Throat: Oropharynx is clear and moist.   Eyes: Pupils are equal, round, and reactive to light. EOM are normal. Right eye exhibits no discharge. Left eye exhibits no discharge. Foreign body present in the left eye. Right conjunctiva is not injected. Left conjunctiva is not injected.       Bilateral pterygiums present    Upon fluroscein exam there is a hair present in left lateral aspect of eye   Neck: Normal range of motion.   Cardiovascular: Normal rate.    Pulmonary/Chest: Effort normal.   Abdominal: Soft. Normal appearance.   Musculoskeletal: Normal range of motion. "   Lymphadenopathy:     She has no cervical adenopathy.   Neurological: She is alert and oriented to person, place, and time.   Skin: Skin is warm and dry. Rash noted. She is not diaphoretic. No erythema.   Psychiatric: She has a normal mood and affect. Her behavior is normal. Judgment and thought content normal.       Procedures: saline irrigation was used to remove foreign body from eye patient felt improved     Assessment/Plan:     1. Pterygium of both eyes     2. Foreign body, eye, left, initial encounter     3. Allergic contact dermatitis due to metals  MethylPREDNISolone (MEDROL DOSEPAK) 4 MG Tablet Therapy Pack    loratadine (CLARITIN) 10 MG Tab   4. Rash     5. Migraine syndrome         Differential diagnosis, natural history, supportive care discussed. Follow-up with primary care provider within 7-10 days, emergency room precautions discussed.  Patient and/or family appears understanding of information.    Chronic conditions of migraine that may potentially impact the patient's ability to recover from this condition appear stable. The patient will f/u with their pcp and continue with current chronic medications as directed.

## 2018-10-08 ENCOUNTER — NON-PROVIDER VISIT (OUTPATIENT)
Dept: URGENT CARE | Facility: CLINIC | Age: 33
End: 2018-10-08

## 2018-10-08 DIAGNOSIS — Z02.1 PRE-EMPLOYMENT DRUG SCREENING: ICD-10-CM

## 2018-10-08 LAB
AMP AMPHETAMINE: NORMAL
BAR BARBITURATES: NORMAL
BZO BENZODIAZEPINES: NORMAL
COC COCAINE: NORMAL
INT CON NEG: NORMAL
INT CON POS: NORMAL
MDMA ECSTASY: NORMAL
MET METHAMPHETAMINES: NORMAL
MTD METHADONE: NORMAL
OPI OPIATES: NORMAL
OXY OXYCODONE: NORMAL
PCP PHENCYCLIDINE: NORMAL
POC URINE DRUG SCREEN OCDRS: NEGATIVE
THC: NORMAL

## 2018-10-08 PROCEDURE — 80305 DRUG TEST PRSMV DIR OPT OBS: CPT | Performed by: NURSE PRACTITIONER

## 2018-10-12 ENCOUNTER — NON-PROVIDER VISIT (OUTPATIENT)
Dept: OCCUPATIONAL MEDICINE | Facility: CLINIC | Age: 33
End: 2018-10-12

## 2018-10-12 DIAGNOSIS — Z02.83 ENCOUNTER FOR DRUG SCREENING: ICD-10-CM

## 2018-10-12 PROCEDURE — 8911 PR MRO FEE: Performed by: INTERNAL MEDICINE

## 2018-11-08 ENCOUNTER — OFFICE VISIT (OUTPATIENT)
Dept: URGENT CARE | Facility: PHYSICIAN GROUP | Age: 33
End: 2018-11-08
Payer: MEDICAID

## 2018-11-08 ENCOUNTER — APPOINTMENT (OUTPATIENT)
Dept: RADIOLOGY | Facility: IMAGING CENTER | Age: 33
End: 2018-11-08
Attending: NURSE PRACTITIONER
Payer: MEDICAID

## 2018-11-08 VITALS
RESPIRATION RATE: 16 BRPM | OXYGEN SATURATION: 100 % | HEIGHT: 63 IN | BODY MASS INDEX: 26.93 KG/M2 | DIASTOLIC BLOOD PRESSURE: 64 MMHG | TEMPERATURE: 98.3 F | SYSTOLIC BLOOD PRESSURE: 108 MMHG | WEIGHT: 152 LBS | HEART RATE: 90 BPM

## 2018-11-08 DIAGNOSIS — R10.32 LEFT LOWER QUADRANT PAIN: ICD-10-CM

## 2018-11-08 DIAGNOSIS — R11.0 NAUSEA: ICD-10-CM

## 2018-11-08 DIAGNOSIS — N39.0 URINARY TRACT INFECTION WITHOUT HEMATURIA, SITE UNSPECIFIED: ICD-10-CM

## 2018-11-08 LAB
APPEARANCE UR: NORMAL
BILIRUB UR STRIP-MCNC: NORMAL MG/DL
COLOR UR AUTO: YELLOW
GLUCOSE UR STRIP.AUTO-MCNC: NORMAL MG/DL
KETONES UR STRIP.AUTO-MCNC: NORMAL MG/DL
LEUKOCYTE ESTERASE UR QL STRIP.AUTO: NORMAL
NITRITE UR QL STRIP.AUTO: NORMAL
PH UR STRIP.AUTO: 6.5 [PH] (ref 5–8)
PROT UR QL STRIP: NORMAL MG/DL
RBC UR QL AUTO: NORMAL
SP GR UR STRIP.AUTO: 1.01
UROBILINOGEN UR STRIP-MCNC: 0.2 MG/DL

## 2018-11-08 PROCEDURE — 81002 URINALYSIS NONAUTO W/O SCOPE: CPT | Performed by: NURSE PRACTITIONER

## 2018-11-08 PROCEDURE — 74019 RADEX ABDOMEN 2 VIEWS: CPT | Performed by: EMERGENCY MEDICINE

## 2018-11-08 PROCEDURE — 99214 OFFICE O/P EST MOD 30 MIN: CPT | Mod: 25 | Performed by: NURSE PRACTITIONER

## 2018-11-08 RX ORDER — NITROFURANTOIN 25; 75 MG/1; MG/1
100 CAPSULE ORAL EVERY 12 HOURS
Qty: 10 CAP | Refills: 0 | Status: SHIPPED | OUTPATIENT
Start: 2018-11-08 | End: 2018-11-13

## 2018-11-08 ASSESSMENT — ENCOUNTER SYMPTOMS
MYALGIAS: 0
DOUBLE VISION: 0
COUGH: 0
DIARRHEA: 0
FLANK PAIN: 0
CONSTIPATION: 0
EYES NEGATIVE: 1
BACK PAIN: 0
BLURRED VISION: 0
PHOTOPHOBIA: 0
FEVER: 1
CARDIOVASCULAR NEGATIVE: 1
CHILLS: 0
DIZZINESS: 1
NAUSEA: 1
ABDOMINAL PAIN: 1
PALPITATIONS: 0
HEADACHES: 0
NECK PAIN: 0
SHORTNESS OF BREATH: 0
WHEEZING: 0
RESPIRATORY NEGATIVE: 1
VOMITING: 0

## 2018-11-08 NOTE — PROGRESS NOTES
Subjective:   Felisha Martin is a 33 y.o. female who presents for Abdominal Pain (Lower L side pain x3d )        HPI   Patient presents with new onset LLQ pain with nausea that started Monday this week. States the pain is worse when she tries to walk or move from sitting to standing position.  She has been running low grade fevers. Yesterday she had an episode of dizziness. Denies alleviating factors.  Denies urinary symptoms  Rates the pain as 8/10 with sharp, stabbing intermittent in pain.  Patient had prior hysterectomy. No risk for STDs, denies vaginal discharge. Denies history of diverticulitis or kidney stones.  Denies chest pain, shortness of breath or wheezing.  Denies vomiting, diarrhea, or constipation.      Review of Systems   Constitutional: Positive for fever. Negative for chills.   Eyes: Negative.  Negative for blurred vision, double vision and photophobia.   Respiratory: Negative.  Negative for cough, shortness of breath and wheezing.    Cardiovascular: Negative.  Negative for chest pain and palpitations.   Gastrointestinal: Positive for abdominal pain and nausea. Negative for constipation, diarrhea and vomiting.   Genitourinary: Negative for dysuria, flank pain, frequency, hematuria and urgency.   Musculoskeletal: Negative for back pain, myalgias and neck pain.   Skin: Negative.    Neurological: Positive for dizziness. Negative for headaches.     No Known Allergies   PMH:  has a past medical history of Anemia and Depression.  MEDS:   Current Outpatient Prescriptions:   •  nitrofurantoin monohydr macro (MACROBID) 100 MG Cap, Take 1 Cap by mouth every 12 hours for 5 days., Disp: 10 Cap, Rfl: 0  •  ibuprofen (MOTRIN) 800 MG Tab, Take 800 mg by mouth every 8 hours as needed., Disp: , Rfl:   •  multivitamin (THERAGRAN) Tab, Take 1 Tab by mouth every day., Disp: 30 Tab, Rfl:   •  vitamin D (VITAMIND D3) 1000 UNIT Tab, Take 1 Tab by mouth every day., Disp: 30 Tab, Rfl: 2  •  omeprazole (PRILOSEC) 20 MG  "delayed-release capsule, Take 1 Cap by mouth 2 times a day. Take BID for 30 days then daily, Disp: 60 Cap, Rfl: 3  •  Prenatal Multivit-Min-Fe-FA (PRENATAL #2 PO), Take 1 Tab by mouth every day., Disp: , Rfl:   •  cyanocobalamin (VITAMIN B-12) 500 MCG Tab, Take 500 mcg by mouth every day., Disp: , Rfl:   ALLERGIES: No Known Allergies  SURGHX:   Past Surgical History:   Procedure Laterality Date   • CHOLECYSTECTOMY       SOCHX:  reports that she has never smoked. She has never used smokeless tobacco. She reports that she does not drink alcohol or use drugs.  FH: Family history was reviewed, no pertinent findings to report     Objective:   /64   Pulse 90   Temp 36.8 °C (98.3 °F) (Temporal)   Resp 16   Ht 1.6 m (5' 3\")   Wt 68.9 kg (152 lb)   SpO2 100%   BMI 26.93 kg/m²   Physical Exam   Constitutional: She is oriented to person, place, and time. She appears well-developed and well-nourished.   HENT:   Right Ear: Hearing and tympanic membrane normal.   Left Ear: Hearing and tympanic membrane normal.   Mouth/Throat: Oropharynx is clear and moist and mucous membranes are normal.   Eyes: Pupils are equal, round, and reactive to light. Conjunctivae are normal.   Cardiovascular: Normal rate, regular rhythm and normal heart sounds.    No murmur heard.  Pulmonary/Chest: Effort normal and breath sounds normal. No respiratory distress.   Abdominal: Soft. Bowel sounds are normal. She exhibits no distension. There is no hepatosplenomegaly. There is tenderness in the left lower quadrant. There is no rigidity, no rebound, no guarding, no CVA tenderness, no tenderness at McBurney's point and negative Amor's sign.   Exquisitely tender LLQ   Neurological: She is alert and oriented to person, place, and time.   Skin: Skin is warm and dry. Capillary refill takes less than 2 seconds.   Psychiatric: She has a normal mood and affect. Her behavior is normal. Judgment and thought content normal.   Vitals reviewed.      "   Assessment/Plan:   Assessment    1. Nausea  - POCT Urinalysis  - CT-ABDOMEN-PELVIS WITH; Future  - URINE CULTURE(NEW); Future    2. Left lower quadrant pain  - CT-ABDOMEN-PELVIS WITH; Future  - [id number] VIEWS; Future    3. Urinary tract infection without hematuria, site unspecified    Other orders  - nitrofurantoin monohydr macro (MACROBID) 100 MG Cap; Take 1 Cap by mouth every 12 hours for 5 days.  Dispense: 10 Cap; Refill: 0    UA shows trace leuks    Xray shows mild amount of stool    Patient has no form of transportation; patient sent for CT at [facility] - 1:30pm check in time - will call patient with reults    Called [facility] and states patient did not show up for CT appointment.    Called and spoke to patient via telephone and states she was unable to find transportation to appointment. She is trying to see if she can find a ride for tomorrow and reschedule appointment    Will treat for UTI and send urine for culture    Patient given strict ER precautions and discussed risks for not obtaining CT scan. Will continue to follow this patient    Differential diagnosis, natural history, supportive care, and indications for immediate follow-up discussed.

## 2018-11-11 ENCOUNTER — TELEPHONE (OUTPATIENT)
Dept: URGENT CARE | Facility: CLINIC | Age: 33
End: 2018-11-11

## 2018-11-11 NOTE — TELEPHONE ENCOUNTER
Attempted to call patient and emergency  in regards to follow up of CT scan since patient has not had scheduled CT. Unable to leave voicemail via either telephone number on file.

## 2018-12-07 ENCOUNTER — NON-PROVIDER VISIT (OUTPATIENT)
Dept: URGENT CARE | Facility: PHYSICIAN GROUP | Age: 33
End: 2018-12-07

## 2018-12-07 DIAGNOSIS — Z02.1 PRE-EMPLOYMENT DRUG SCREENING: ICD-10-CM

## 2018-12-07 LAB
AMP AMPHETAMINE: NORMAL
COC COCAINE: NORMAL
INT CON NEG: NORMAL
INT CON POS: NORMAL
MET METHAMPHETAMINES: NORMAL
OPI OPIATES: NORMAL
PCP PHENCYCLIDINE: NORMAL
POC DRUG COMMENT 753798-OCCUPATIONAL HEALTH: NORMAL
THC: NORMAL

## 2018-12-07 PROCEDURE — 80305 DRUG TEST PRSMV DIR OPT OBS: CPT | Performed by: NURSE PRACTITIONER

## 2019-07-24 ENCOUNTER — OFFICE VISIT (OUTPATIENT)
Dept: URGENT CARE | Facility: PHYSICIAN GROUP | Age: 34
End: 2019-07-24
Payer: MEDICAID

## 2019-07-24 VITALS
DIASTOLIC BLOOD PRESSURE: 78 MMHG | RESPIRATION RATE: 16 BRPM | HEART RATE: 112 BPM | TEMPERATURE: 98.6 F | OXYGEN SATURATION: 98 % | SYSTOLIC BLOOD PRESSURE: 132 MMHG | BODY MASS INDEX: 23.38 KG/M2 | WEIGHT: 132 LBS

## 2019-07-24 DIAGNOSIS — L03.031 PARONYCHIA OF GREAT TOE OF RIGHT FOOT: ICD-10-CM

## 2019-07-24 PROCEDURE — 99214 OFFICE O/P EST MOD 30 MIN: CPT | Performed by: PHYSICIAN ASSISTANT

## 2019-07-24 RX ORDER — SULFAMETHOXAZOLE AND TRIMETHOPRIM 800; 160 MG/1; MG/1
1 TABLET ORAL 2 TIMES DAILY
Qty: 14 TAB | Refills: 0 | Status: SHIPPED | OUTPATIENT
Start: 2019-07-24 | End: 2019-07-31

## 2019-07-25 NOTE — PROGRESS NOTES
Chief Complaint   Patient presents with   • Foot Pain     (R) foot bigtoe poss infected x4 days        HISTORY OF PRESENT ILLNESS: Patient is a 33 y.o. female who presents today for the following:    Patient comes in for evaluation of pain in the right great toe.  Started about 4 days ago.  She complains of pain with any pressure in the area, including socks and shoes.  She has not taken any over-the-counter medication.  She denies any fever, night sweats, chills, and drainage.  She denies history of the same.    Patient Active Problem List    Diagnosis Date Noted   • Major depressive disorder 03/16/2017   • Iron deficiency anemia 03/15/2017       Allergies:Patient has no known allergies.    Current Outpatient Prescriptions Ordered in [facility]   Medication Sig Dispense Refill   • sulfamethoxazole-trimethoprim (BACTRIM DS) 800-160 MG tablet Take 1 Tab by mouth 2 times a day for 7 days. 14 Tab 0   • ibuprofen (MOTRIN) 800 MG Tab Take 800 mg by mouth every 8 hours as needed.     • multivitamin (THERAGRAN) Tab Take 1 Tab by mouth every day. 30 Tab    • vitamin D (VITAMIND D3) 1000 UNIT Tab Take 1 Tab by mouth every day. (Patient not taking: Reported on 7/24/2019) 30 Tab 2   • omeprazole (PRILOSEC) 20 MG delayed-release capsule Take 1 Cap by mouth 2 times a day. Take BID for 30 days then daily (Patient not taking: Reported on 7/24/2019) 60 Cap 3   • Prenatal Multivit-Min-Fe-FA (PRENATAL #2 PO) Take 1 Tab by mouth every day.     • cyanocobalamin (VITAMIN B-12) 500 MCG Tab Take 500 mcg by mouth every day.       No current [facility]-ordered facility-administered medications on file.        Past Medical History:   Diagnosis Date   • Anemia    • Depression        Social History   Substance Use Topics   • Smoking status: Never Smoker   • Smokeless tobacco: Never Used   • Alcohol use No      Comment: 4-5 shots 2 days a week       Family Status   Relation Status   • Sis (Not Specified)   • MAunt (Not Specified)     Family History    Problem Relation Age of Onset   • Diabetes Sister         type I    • Breast Cancer Maternal Aunt        Review of Systems:   Constitutional ROS: No unexpected change in weight, No weakness, No fatigue  Eye ROS: No recent significant change in vision, No eye pain, redness, discharge  Ear ROS: No drainage, No tinnitus or vertigo, No recent change in hearing  Mouth/Throat ROS: No teeth or gum problems, No bleeding gums, No tongue complaints  Neck ROS: No swollen glands, No significant pain in neck  Pulmonary ROS: No chronic cough, sputum, or hemoptysis, No dyspnea on exertion, No wheezing  Cardiovascular ROS: No diaphoresis, No edema, No palpitations  Gastrointestinal ROS: No change in bowel habits, No significant change in appetite, No nausea, vomiting, diarrhea, or constipation  Musculoskeletal/Extremities ROS: Tenderness of the right great toe.      Exam:  /78   Pulse (!) 112   Temp 37 °C (98.6 °F)   Resp 16   Wt 59.9 kg (132 lb)   SpO2 98%   General: Well developed, well nourished. No distress.  HEENT: Head is grossly normal.  Pulmonary: Unlabored respiratory effort.    Neurologic: Grossly nonfocal. No facial asymmetry noted.  Extremities: Large paronychia noted on the right great toe filled with purulent drainage.  There is surrounding erythema, tenderness, trace edema of the right great toe.  Stab incision with a #11 blade was done after area was prepped with alcohol.  Skin: Warm, dry, good turgor.    Psych: Normal mood. Alert and oriented x3. Judgment and insight is normal.    Assessment/Plan:  Recommend soaking right great toe in warm Epson salt water for 15 to 20 minutes at a time, 1-2 times a day, for the next 2 to 3 days.  Antibiotics provided if symptoms do not improve.  Follow up for worsening or persistent symptoms.  1. Paronychia of great toe of right foot  sulfamethoxazole-trimethoprim (BACTRIM DS) 800-160 MG tablet

## 2020-02-29 ENCOUNTER — OFFICE VISIT (OUTPATIENT)
Dept: URGENT CARE | Facility: PHYSICIAN GROUP | Age: 35
End: 2020-02-29
Payer: MEDICAID

## 2020-02-29 VITALS
RESPIRATION RATE: 16 BRPM | DIASTOLIC BLOOD PRESSURE: 74 MMHG | BODY MASS INDEX: 24.62 KG/M2 | OXYGEN SATURATION: 97 % | HEART RATE: 110 BPM | SYSTOLIC BLOOD PRESSURE: 132 MMHG | TEMPERATURE: 98.7 F | WEIGHT: 139 LBS

## 2020-02-29 DIAGNOSIS — H60.13 CELLULITIS OF BOTH EARS: ICD-10-CM

## 2020-02-29 DIAGNOSIS — H60.503 ACUTE OTITIS EXTERNA OF BOTH EARS, UNSPECIFIED TYPE: ICD-10-CM

## 2020-02-29 PROCEDURE — 99214 OFFICE O/P EST MOD 30 MIN: CPT | Performed by: NURSE PRACTITIONER

## 2020-02-29 RX ORDER — NEOMYCIN SULFATE, POLYMYXIN B SULFATE AND HYDROCORTISONE 10; 3.5; 1 MG/ML; MG/ML; [USP'U]/ML
5 SUSPENSION/ DROPS AURICULAR (OTIC) 3 TIMES DAILY
Qty: 1 BOTTLE | Refills: 0 | Status: SHIPPED | OUTPATIENT
Start: 2020-02-29 | End: 2020-03-19

## 2020-02-29 RX ORDER — CEPHALEXIN 500 MG/1
500 CAPSULE ORAL 3 TIMES DAILY
Qty: 15 CAP | Refills: 0 | Status: SHIPPED | OUTPATIENT
Start: 2020-02-29 | End: 2020-03-05

## 2020-02-29 RX ORDER — IBUPROFEN 600 MG/1
600 TABLET ORAL EVERY 6 HOURS PRN
Qty: 30 TAB | Refills: 0 | Status: SHIPPED | OUTPATIENT
Start: 2020-02-29

## 2020-02-29 ASSESSMENT — ENCOUNTER SYMPTOMS
RHINORRHEA: 0
SHORTNESS OF BREATH: 0
EYE PAIN: 0
ABDOMINAL PAIN: 0
MYALGIAS: 0
SORE THROAT: 0
DIZZINESS: 0
DIARRHEA: 0
NAUSEA: 0
VOMITING: 0
FEVER: 0
CHILLS: 0

## 2020-02-29 NOTE — PROGRESS NOTES
Subjective:   Felisha Martin  is a 34 y.o. female who presents for Otalgia (B ears x4d/ Pt states poss Ear infection)        Otalgia    There is pain in both ears. This is a new problem. Episode onset: 4 days. The problem occurs constantly. There has been no fever. The pain is at a severity of 10/10. The pain is severe. Associated symptoms include a rash. Pertinent negatives include no abdominal pain, diarrhea, ear discharge, hearing loss, rhinorrhea, sore throat or vomiting. She has tried nothing for the symptoms. The treatment provided no relief. There is no history of a chronic ear infection.     Review of Systems   Constitutional: Negative for chills and fever.   HENT: Positive for ear pain. Negative for ear discharge, hearing loss, rhinorrhea and sore throat.    Eyes: Negative for pain.   Respiratory: Negative for shortness of breath.    Cardiovascular: Negative for chest pain.   Gastrointestinal: Negative for abdominal pain, diarrhea, nausea and vomiting.   Genitourinary: Negative for hematuria.   Musculoskeletal: Negative for myalgias.   Skin: Positive for rash.   Neurological: Negative for dizziness.     No Known Allergies   Objective:   /74   Pulse (!) 110   Temp 37.1 °C (98.7 °F) (Temporal)   Resp 16   Wt 63 kg (139 lb)   SpO2 97%   BMI 24.62 kg/m²   Physical Exam  Vitals signs and nursing note reviewed.   Constitutional:       General: She is not in acute distress.     Appearance: She is well-developed.   HENT:      Head: Normocephalic and atraumatic.      Right Ear: Tympanic membrane and external ear normal. Swelling and tenderness present. No drainage. Tympanic membrane is not erythematous or bulging.      Left Ear: Tympanic membrane and external ear normal. Swelling and tenderness present. No drainage. Tympanic membrane is not erythematous or bulging.      Ears:        Nose: Nose normal.      Right Sinus: No maxillary sinus tenderness or frontal sinus tenderness.      Left Sinus: No  maxillary sinus tenderness or frontal sinus tenderness.      Mouth/Throat:      Mouth: Mucous membranes are moist.      Pharynx: Uvula midline. No posterior oropharyngeal erythema.      Tonsils: No tonsillar exudate or tonsillar abscesses.   Eyes:      General:         Right eye: No discharge.         Left eye: No discharge.      Conjunctiva/sclera: Conjunctivae normal.      Pupils: Pupils are equal, round, and reactive to light.   Cardiovascular:      Rate and Rhythm: Normal rate and regular rhythm.      Heart sounds: No murmur.   Pulmonary:      Effort: Pulmonary effort is normal. No respiratory distress.      Breath sounds: Normal breath sounds.   Abdominal:      General: There is no distension.      Palpations: Abdomen is soft.      Tenderness: There is no abdominal tenderness.   Musculoskeletal: Normal range of motion.   Skin:     General: Skin is warm and dry.   Neurological:      General: No focal deficit present.      Mental Status: She is alert and oriented to person, place, and time. Mental status is at baseline.      Gait: Gait (gait at baseline) normal.   Psychiatric:         Judgment: Judgment normal.           Assessment/Plan:   1. Acute otitis externa of both ears, unspecified type  - neomycin-polymyxin-HC (PEDIOTIC HC) 3.5-25461-9 Suspension; Place 5 Drops in ear 3 times a day.  Dispense: 1 Bottle; Refill: 0    2. Cellulitis of both ears  - cephALEXin (KEFLEX) 500 MG Cap; Take 1 Cap by mouth 3 times a day for 5 days.  Dispense: 15 Cap; Refill: 0  Advised to continue supportive care with Tylenol and/or ibuprofen for fevers and discomfort. Increased fluids and electrolytes.   Differential diagnosis, natural history, supportive care, and indications for immediate follow-up discussed.

## 2020-03-19 ENCOUNTER — HOSPITAL ENCOUNTER (EMERGENCY)
Facility: MEDICAL CENTER | Age: 35
End: 2020-03-19
Attending: EMERGENCY MEDICINE
Payer: MEDICAID

## 2020-03-19 ENCOUNTER — APPOINTMENT (OUTPATIENT)
Dept: RADIOLOGY | Facility: MEDICAL CENTER | Age: 35
End: 2020-03-19
Attending: EMERGENCY MEDICINE
Payer: MEDICAID

## 2020-03-19 ENCOUNTER — OFFICE VISIT (OUTPATIENT)
Dept: URGENT CARE | Facility: PHYSICIAN GROUP | Age: 35
End: 2020-03-19
Payer: MEDICAID

## 2020-03-19 VITALS
HEART RATE: 98 BPM | DIASTOLIC BLOOD PRESSURE: 76 MMHG | WEIGHT: 141.31 LBS | RESPIRATION RATE: 18 BRPM | TEMPERATURE: 96.5 F | OXYGEN SATURATION: 100 % | BODY MASS INDEX: 25.04 KG/M2 | HEIGHT: 63 IN | SYSTOLIC BLOOD PRESSURE: 113 MMHG

## 2020-03-19 VITALS
SYSTOLIC BLOOD PRESSURE: 116 MMHG | BODY MASS INDEX: 24.84 KG/M2 | HEART RATE: 104 BPM | RESPIRATION RATE: 16 BRPM | HEIGHT: 63 IN | DIASTOLIC BLOOD PRESSURE: 68 MMHG | WEIGHT: 140.2 LBS | OXYGEN SATURATION: 100 % | TEMPERATURE: 98.1 F

## 2020-03-19 DIAGNOSIS — H60.01 ABSCESS OF RIGHT EAR CANAL: ICD-10-CM

## 2020-03-19 DIAGNOSIS — D64.9 ANEMIA, UNSPECIFIED TYPE: ICD-10-CM

## 2020-03-19 LAB
ALBUMIN SERPL BCP-MCNC: 4.2 G/DL (ref 3.2–4.9)
ALBUMIN/GLOB SERPL: 1.4 G/DL
ALP SERPL-CCNC: 91 U/L (ref 30–99)
ALT SERPL-CCNC: 11 U/L (ref 2–50)
ANION GAP SERPL CALC-SCNC: 10 MMOL/L (ref 7–16)
ANISOCYTOSIS BLD QL SMEAR: ABNORMAL
AST SERPL-CCNC: 17 U/L (ref 12–45)
BASOPHILS # BLD AUTO: 0.8 % (ref 0–1.8)
BASOPHILS # BLD: 0.04 K/UL (ref 0–0.12)
BILIRUB SERPL-MCNC: 0.3 MG/DL (ref 0.1–1.5)
BUN SERPL-MCNC: 6 MG/DL (ref 8–22)
CALCIUM SERPL-MCNC: 8.6 MG/DL (ref 8.5–10.5)
CHLORIDE SERPL-SCNC: 102 MMOL/L (ref 96–112)
CO2 SERPL-SCNC: 23 MMOL/L (ref 20–33)
COMMENT 1642: NORMAL
CREAT SERPL-MCNC: 0.51 MG/DL (ref 0.5–1.4)
DACRYOCYTES BLD QL SMEAR: NORMAL
EOSINOPHIL # BLD AUTO: 0.07 K/UL (ref 0–0.51)
EOSINOPHIL NFR BLD: 1.4 % (ref 0–6.9)
ERYTHROCYTE [DISTWIDTH] IN BLOOD BY AUTOMATED COUNT: 46.3 FL (ref 35.9–50)
GLOBULIN SER CALC-MCNC: 3.1 G/DL (ref 1.9–3.5)
GLUCOSE SERPL-MCNC: 85 MG/DL (ref 65–99)
HCG SERPL QL: NEGATIVE
HCT VFR BLD AUTO: 30.1 % (ref 37–47)
HGB BLD-MCNC: 8.3 G/DL (ref 12–16)
HYPOCHROMIA BLD QL SMEAR: ABNORMAL
IMM GRANULOCYTES # BLD AUTO: 0.01 K/UL (ref 0–0.11)
IMM GRANULOCYTES NFR BLD AUTO: 0.2 % (ref 0–0.9)
LACTATE BLD-SCNC: 1.4 MMOL/L (ref 0.5–2)
LYMPHOCYTES # BLD AUTO: 1.09 K/UL (ref 1–4.8)
LYMPHOCYTES NFR BLD: 22.2 % (ref 22–41)
MCH RBC QN AUTO: 19 PG (ref 27–33)
MCHC RBC AUTO-ENTMCNC: 27.6 G/DL (ref 33.6–35)
MCV RBC AUTO: 69 FL (ref 81.4–97.8)
MICROCYTES BLD QL SMEAR: ABNORMAL
MONOCYTES # BLD AUTO: 0.22 K/UL (ref 0–0.85)
MONOCYTES NFR BLD AUTO: 4.5 % (ref 0–13.4)
MORPHOLOGY BLD-IMP: NORMAL
NEUTROPHILS # BLD AUTO: 3.49 K/UL (ref 2–7.15)
NEUTROPHILS NFR BLD: 70.9 % (ref 44–72)
NRBC # BLD AUTO: 0 K/UL
NRBC BLD-RTO: 0 /100 WBC
PLATELET # BLD AUTO: 217 K/UL (ref 164–446)
POIKILOCYTOSIS BLD QL SMEAR: NORMAL
POTASSIUM SERPL-SCNC: 3.3 MMOL/L (ref 3.6–5.5)
PROT SERPL-MCNC: 7.3 G/DL (ref 6–8.2)
RBC # BLD AUTO: 4.36 M/UL (ref 4.2–5.4)
RBC BLD AUTO: PRESENT
SODIUM SERPL-SCNC: 135 MMOL/L (ref 135–145)
WBC # BLD AUTO: 4.9 K/UL (ref 4.8–10.8)

## 2020-03-19 PROCEDURE — 80053 COMPREHEN METABOLIC PANEL: CPT

## 2020-03-19 PROCEDURE — 85025 COMPLETE CBC W/AUTO DIFF WBC: CPT

## 2020-03-19 PROCEDURE — 87040 BLOOD CULTURE FOR BACTERIA: CPT

## 2020-03-19 PROCEDURE — 99214 OFFICE O/P EST MOD 30 MIN: CPT | Performed by: FAMILY MEDICINE

## 2020-03-19 PROCEDURE — 96374 THER/PROPH/DIAG INJ IV PUSH: CPT | Mod: XU

## 2020-03-19 PROCEDURE — 700117 HCHG RX CONTRAST REV CODE 255: Performed by: EMERGENCY MEDICINE

## 2020-03-19 PROCEDURE — A9270 NON-COVERED ITEM OR SERVICE: HCPCS | Performed by: EMERGENCY MEDICINE

## 2020-03-19 PROCEDURE — 700105 HCHG RX REV CODE 258: Performed by: EMERGENCY MEDICINE

## 2020-03-19 PROCEDURE — 700102 HCHG RX REV CODE 250 W/ 637 OVERRIDE(OP): Performed by: EMERGENCY MEDICINE

## 2020-03-19 PROCEDURE — 700111 HCHG RX REV CODE 636 W/ 250 OVERRIDE (IP)

## 2020-03-19 PROCEDURE — 70481 CT ORBIT/EAR/FOSSA W/DYE: CPT

## 2020-03-19 PROCEDURE — 84703 CHORIONIC GONADOTROPIN ASSAY: CPT

## 2020-03-19 PROCEDURE — 83605 ASSAY OF LACTIC ACID: CPT

## 2020-03-19 PROCEDURE — 99285 EMERGENCY DEPT VISIT HI MDM: CPT

## 2020-03-19 RX ORDER — SULFAMETHOXAZOLE AND TRIMETHOPRIM 800; 160 MG/1; MG/1
1 TABLET ORAL ONCE
Status: COMPLETED | OUTPATIENT
Start: 2020-03-19 | End: 2020-03-19

## 2020-03-19 RX ORDER — MUPIROCIN CALCIUM 20 MG/G
1 CREAM TOPICAL 2 TIMES DAILY
Qty: 1 TUBE | Refills: 0 | Status: SHIPPED | OUTPATIENT
Start: 2020-03-19 | End: 2020-03-26

## 2020-03-19 RX ORDER — SULFAMETHOXAZOLE AND TRIMETHOPRIM 800; 160 MG/1; MG/1
1 TABLET ORAL 2 TIMES DAILY
Qty: 14 TAB | Refills: 0 | Status: SHIPPED | OUTPATIENT
Start: 2020-03-19 | End: 2020-03-26

## 2020-03-19 RX ORDER — KETOROLAC TROMETHAMINE 30 MG/ML
15 INJECTION, SOLUTION INTRAMUSCULAR; INTRAVENOUS ONCE
Status: COMPLETED | OUTPATIENT
Start: 2020-03-19 | End: 2020-03-19

## 2020-03-19 RX ORDER — KETOROLAC TROMETHAMINE 30 MG/ML
INJECTION, SOLUTION INTRAMUSCULAR; INTRAVENOUS
Status: COMPLETED
Start: 2020-03-19 | End: 2020-03-19

## 2020-03-19 RX ORDER — SODIUM CHLORIDE 9 MG/ML
1000 INJECTION, SOLUTION INTRAVENOUS ONCE
Status: COMPLETED | OUTPATIENT
Start: 2020-03-19 | End: 2020-03-19

## 2020-03-19 RX ADMIN — KETOROLAC TROMETHAMINE 15 MG: 30 INJECTION, SOLUTION INTRAMUSCULAR; INTRAVENOUS at 22:50

## 2020-03-19 RX ADMIN — SULFAMETHOXAZOLE AND TRIMETHOPRIM 1 TABLET: 800; 160 TABLET ORAL at 22:51

## 2020-03-19 RX ADMIN — KETOROLAC TROMETHAMINE 15 MG: 30 INJECTION, SOLUTION INTRAMUSCULAR at 22:50

## 2020-03-19 RX ADMIN — IOHEXOL 80 ML: 350 INJECTION, SOLUTION INTRAVENOUS at 22:00

## 2020-03-19 RX ADMIN — SODIUM CHLORIDE 1000 ML: 900 INJECTION INTRAVENOUS at 21:34

## 2020-03-20 NOTE — PROGRESS NOTES
"Subjective:      Felisha Martin is a 34 y.o. female who presents with Otalgia (was seen before for the same ear infection, ears are still hurting terribly, (R) ear is bleeding, itches, ear drops not working )            This a new problem.  34-year-old female presenting for evaluation of ear pain.  She was seen 3 weeks ago for ear pain and was given eardrops and oral antibiotic.  Her right ear is still hurting and has been draining for the past 24 hours.  She has also been feeling feverish.  She finished oral antibiotic but she has been using the topical antibiotic in the ear with no improvement.      Review of Systems   All other systems reviewed and are negative.         Objective:     /68   Pulse (!) 104   Temp 36.7 °C (98.1 °F)   Resp 16   Ht 1.6 m (5' 3\")   Wt 63.6 kg (140 lb 3.2 oz)   SpO2 100%   BMI 24.84 kg/m²      Physical Exam  Constitutional:       General: She is in acute distress.      Appearance: She is not ill-appearing, toxic-appearing or diaphoretic.   HENT:      Head: Normocephalic and atraumatic.      Right Ear: Drainage, swelling and tenderness present.      Left Ear: Tympanic membrane, ear canal and external ear normal.      Ears:      Comments: Abscess noted in the right ear canal with purulent discharge     Nose: No rhinorrhea.      Mouth/Throat:      Pharynx: Oropharynx is clear.   Eyes:      Conjunctiva/sclera: Conjunctivae normal.   Neck:      Musculoskeletal: Neck supple.   Cardiovascular:      Rate and Rhythm: Regular rhythm. Tachycardia present.      Heart sounds: No gallop.    Pulmonary:      Effort: Pulmonary effort is normal. No respiratory distress.      Breath sounds: No stridor.   Lymphadenopathy:      Cervical: Cervical adenopathy present.   Skin:     General: Skin is warm.      Coloration: Skin is not jaundiced or pale.   Neurological:      Mental Status: She is alert and oriented to person, place, and time.   Psychiatric:         Judgment: Judgment normal.          "        Assessment/Plan:     1. Abscess of right ear canal    She will be needing I&D, likely also IV antibiotic and further evaluation in the ED setting.  Patient was advised to go to the emergency department.

## 2020-03-20 NOTE — ED TRIAGE NOTES
"Chief Complaint   Patient presents with   • Ear Pain     35 yo female to triage for above complaint. R sided ear pain with yellow drainage, recently finished course of ABX, was told to come here from [facility].     Educated on triage process, encourage to inform staff of any changes.     /81   Pulse (!) 120   Temp 35.8 °C (96.5 °F) (Oral)   Resp 18   Ht 1.6 m (5' 3\")   Wt 64.1 kg (141 lb 5 oz)   SpO2 100%   BMI 25.03 kg/m²   "

## 2020-03-20 NOTE — ED NOTES
Patient has been having ear pain for the last 3x weeks. Started in the Left ear, but then progressed to both. Patient was placed on ABX. Was seen at  earlier today, concern for an abscess in her right ear, told to come to the ER.

## 2020-03-20 NOTE — ED PROVIDER NOTES
"ED Provider Note    Scribed for Kimmy Emery D.O. by Paula Dillard. 3/19/2020, 8:47 PM.    Primary care provider: Pcp Pt States None  Means of arrival: Walk-In  History obtained from: Patient  History limited by: Non    CHIEF COMPLAINT  Chief Complaint   Patient presents with   • Ear Pain       HPI  Felisha Martin is a 34 y.o. female who presents to the Emergency Department for bilateral ear pain onset 3 weeks ago. The patient states the pain started in her left ear but then progressed to both. She states that she was seen and started on oral antibiotics as well as ear drops. She finished the full course of oral antibiotics a couple of days ago and states that the pain has been persistent. Today, she says the pain in her right ear felt like \"there was water was stuck in my ear\", and reports drainage and blood coming out of her ear when lying down a certain way. The patient was prompted to go to an Urgent Care in [facility] earlier today after her pain had worsened and was told she had an abscess in her right ear. She was encouraged to go to the [facility] ED for further evaluation and was provided oral antibiotics and ear drops. No alleviating or exacerbating factors were noted. Patient denies nausea, vomiting, dyspnea, rhinorrhea, abdominal pain, urinary incontinence, or cough. Patient has no known allergies or any daily medications that she takes.     REVIEW OF SYSTEMS  See HPI for further details. All other systems negative.     PAST MEDICAL HISTORY   has a past medical history of Anemia and Depression.    SURGICAL HISTORY   has a past surgical history that includes cholecystectomy.    SOCIAL HISTORY  Social History     Tobacco Use   • Smoking status: Never Smoker   • Smokeless tobacco: Never Used   • Tobacco comment: 1 or 2 cigarettes a day   Substance Use Topics   • Alcohol use: No     Alcohol/week: 0.0 oz     Comment: 4-5 shots 2 days a week   • Drug use: No     Comment: mj- last use 8 years ago      Social History " "    Substance and Sexual Activity   Drug Use No    Comment: mj- last use 8 years ago       FAMILY HISTORY  Family History   Problem Relation Age of Onset   • Diabetes Sister         type I    • Breast Cancer Maternal Aunt        CURRENT MEDICATIONS  Reviewed.  See Encounter Summary.     ALLERGIES  No Known Allergies    PHYSICAL EXAM  VITAL SIGNS: /81   Pulse (!) 120   Temp 35.8 °C (96.5 °F) (Oral)   Resp 18   Ht 1.6 m (5' 3\")   Wt 64.1 kg (141 lb 5 oz)   SpO2 100%   BMI 25.03 kg/m²   Constitutional: Alert and in no apparent distress.  HENT: Left TM is opaque with no erythema or bulging noted. There is no tenderness over the left mastoid. Right TM is unable to be visualized secondary to a mass in the external auditory canal. The mass is tender and appears to be draining purulent discharge. There is mild erythema and tenderness of the right mastoid. Normocephalic atraumatic. Nose normal. Mucous membranes are moist.  Eyes: Pupils are equal and reactive. Conjunctiva normal. Non-icteric sclera.   Neck: Normal range of motion without tenderness. Supple. No meningeal signs.  Cardiovascular: Tachycardic rate, regular rhythm. No murmurs, gallops or rubs.  Thorax & Lungs: Breath sounds are clear to auscultation bilaterally. No wheezing, rhonchi or rales.  Abdomen: Soft, nontender and nondistended. No peritoneal signs noted.  Skin: Warm and dry. No rashes are noted.  Back: No bony tenderness, No CVA tenderness.   Extremities: 2+ peripheral pulses. Cap refill is less than 2 seconds. No edema, cyanosis, or clubbing.  Musculoskeletal: Good range of motion in all major joints. No tenderness to palpation or major deformities noted.   Neurologic: Alert and oriented ×3. The patient moves all 4 extremities and follows commands.  Psychiatric: Affect is normal. Judgment appears to be intact.    DIAGNOSTIC STUDIES / PROCEDURES     LABS  Results for orders placed or performed during the hospital encounter of 03/19/20   CBC " WITH DIFFERENTIAL   Result Value Ref Range    WBC 4.9 4.8 - 10.8 K/uL    RBC 4.36 4.20 - 5.40 M/uL    Hemoglobin 8.3 (L) 12.0 - 16.0 g/dL    Hematocrit 30.1 (L) 37.0 - 47.0 %    MCV 69.0 (L) 81.4 - 97.8 fL    MCH 19.0 (L) 27.0 - 33.0 pg    MCHC 27.6 (L) 33.6 - 35.0 g/dL    RDW 46.3 35.9 - 50.0 fL    Platelet Count 217 164 - 446 K/uL    Neutrophils-Polys 70.90 44.00 - 72.00 %    Lymphocytes 22.20 22.00 - 41.00 %    Monocytes 4.50 0.00 - 13.40 %    Eosinophils 1.40 0.00 - 6.90 %    Basophils 0.80 0.00 - 1.80 %    Immature Granulocytes 0.20 0.00 - 0.90 %    Nucleated RBC 0.00 /100 WBC    Neutrophils (Absolute) 3.49 2.00 - 7.15 K/uL    Lymphs (Absolute) 1.09 1.00 - 4.80 K/uL    Monos (Absolute) 0.22 0.00 - 0.85 K/uL    Eos (Absolute) 0.07 0.00 - 0.51 K/uL    Baso (Absolute) 0.04 0.00 - 0.12 K/uL    Immature Granulocytes (abs) 0.01 0.00 - 0.11 K/uL    NRBC (Absolute) 0.00 K/uL    Hypochromia 2+     Anisocytosis 2+     Microcytosis 2+    COMP METABOLIC PANEL   Result Value Ref Range    Sodium 135 135 - 145 mmol/L    Potassium 3.3 (L) 3.6 - 5.5 mmol/L    Chloride 102 96 - 112 mmol/L    Co2 23 20 - 33 mmol/L    Anion Gap 10.0 7.0 - 16.0    Glucose 85 65 - 99 mg/dL    Bun 6 (L) 8 - 22 mg/dL    Creatinine 0.51 0.50 - 1.40 mg/dL    Calcium 8.6 8.5 - 10.5 mg/dL    AST(SGOT) 17 12 - 45 U/L    ALT(SGPT) 11 2 - 50 U/L    Alkaline Phosphatase 91 30 - 99 U/L    Total Bilirubin 0.3 0.1 - 1.5 mg/dL    Albumin 4.2 3.2 - 4.9 g/dL    Total Protein 7.3 6.0 - 8.2 g/dL    Globulin 3.1 1.9 - 3.5 g/dL    A-G Ratio 1.4 g/dL   LACTIC ACID   Result Value Ref Range    Lactic Acid 1.4 0.5 - 2.0 mmol/L   HCG QUAL SERUM   Result Value Ref Range    Beta-Hcg Qualitative Serum Negative Negative   ESTIMATED GFR   Result Value Ref Range    GFR If African American >60 >60 mL/min/1.73 m 2    GFR If Non African American >60 >60 mL/min/1.73 m 2   PERIPHERAL SMEAR REVIEW   Result Value Ref Range    Peripheral Smear Review see below    MORPHOLOGY   Result  Value Ref Range    RBC Morphology Present     Poikilocytosis 1+     Tear Drop Cells 1+    DIFFERENTIAL COMMENT   Result Value Ref Range    Comments-Diff see below      All labs were reviewed by me.    RADIOLOGY  CT-POST-FOSSA-EAR WITH   Final Result      1.  Subcentimeter rim-enhancing abscess in the subcutaneous tissues of the inferior right external auditory canal with thickening of the adjacent overlying skin.        The radiologist's interpretation of all radiological studies have been reviewed by me.    COURSE & MEDICAL DECISION MAKING  Pertinent Labs & Imaging studies reviewed. (See chart for details)    8:47 PM - Patient seen and examined at bedside.  She appeared uncomfortable and was noted to be tachycardic.  She did not appear to be in any acute distress, however.  Close inspection of the ears revealed purulent/bloody discharge coming from the right ear canal.  A mass was obstructing the view of the TM and appeared quite tender.  She also had mild erythema and tenderness over the right mastoid.  Discussed plan of care with patient. I informed them that labs and imaging will be ordered to evaluate symptoms. The patient is understanding and agreeable with plan of care. Patient will be treated with 1,000 mL NS infusion. Ordered CT-Post-Fossa-Ear, CBC w/ Differential, CMP, Lactic Acid, HCG Qual Serum, and Blood Culture to evaluate her symptoms.     I reviewed her work-up which was reassuring with a normal white blood cell count and lactic acid.  Repeat heart rate was normal.  I am much less concern for sepsis at this time.  She was noted to be anemic with an H&H of 8.3 and 30.1, respectively.  I did review her previous labs and this was close to her baseline.  Electrolytes are reassuring.  I did obtain a CT of the posterior fossa out of concern for potential mastoiditis or extension of the abscess.  No evidence of mastoiditis was noted.  It did show a small, less than 1 cm, abscess in the subcutaneous tissues  of the right external auditory canal.    10:28 PM Paged ENT    10:33 PM I discussed the patient's case and the above findings with Dr. Coker (ENT) who agreed with the pan for aspiration.  He recommended using a Q-tip to help express the purulent material.  He also recommended placing the patient on mupirocin cream and Bactrim.      10:43 PM I attempted to aspirate the abscess.  However, when cleaning the area with Betadine on the end of a Q-tip, I was able to unroof the area and to get some drainage.  The patient was started on Bactrim here in the ED and sent home with a prescription as well as a prescription for mupirocin ointment.  She was given follow-up with Dr. Coker, ENT.  I encouraged her to follow-up and return to the ED with any worsening signs or symptoms including but not limited to the development of a fever, worsening pain, or radiation of the pain to her posterior skull.    HYDRATION: Based on the patient's presentation of Tachycardia the patient was given IV fluids. IV Hydration was used because oral hydration was not adequate alone. Upon recheck following hydration, the patient was improved.    FINAL IMPRESSION  1. Abscess of right ear canal      PRESCRIPTIONS  New Prescriptions    MUPIROCIN CALCIUM (BACTROBAN) 2 % CREAM    Apply 1 Strip to affected area(s) 2 times a day for 7 days.    SULFAMETHOXAZOLE-TRIMETHOPRIM (BACTRIM DS) 800-160 MG TABLET    Take 1 Tab by mouth 2 times a day for 7 days.     FOLLOW UP  Sebastian Coker M.D.  [address] [facility] [address]  [phone]    Call in 1 day  To schedule a follow up appointment    [facility], Emergency Dept  [address] [facility] [address] [phone]  Go to   As needed    -DISCHARGE-     IPaula (Chikis), am scribing for, and in the presence of, Kimmy Emery D.O..    Electronically signed by: Paula Craven), 3/19/2020    Kimmy CHOPRA D.O. personally performed the services described in this  documentation, as scribed by Paula Dillard in my presence, and it is both accurate and complete. C    The note accurately reflects work and decisions made by me.  Kimmy Emery D.O.  3/19/2020  10:52 PM

## 2020-03-20 NOTE — PATIENT INSTRUCTIONS
You have an abscess in the ear canal that needs drainage and evaluation in the emergency department setting    Please go to the nearest emergency department of your choice for further evaluation

## 2020-03-24 LAB
BACTERIA BLD CULT: NORMAL
BACTERIA BLD CULT: NORMAL
SIGNIFICANT IND 70042: NORMAL
SIGNIFICANT IND 70042: NORMAL
SITE SITE: NORMAL
SITE SITE: NORMAL
SOURCE SOURCE: NORMAL
SOURCE SOURCE: NORMAL

## 2020-03-24 NOTE — ED NOTES
Outpatient pharmacy called, Mupiricon cream not covered. Will change to mupiricon ointment.     Mayra Saucedo, PharmD

## 2020-04-03 ENCOUNTER — APPOINTMENT (OUTPATIENT)
Dept: RADIOLOGY | Facility: MEDICAL CENTER | Age: 35
End: 2020-04-03
Attending: EMERGENCY MEDICINE
Payer: MEDICAID

## 2020-04-03 ENCOUNTER — HOSPITAL ENCOUNTER (EMERGENCY)
Facility: MEDICAL CENTER | Age: 35
End: 2020-04-03
Attending: EMERGENCY MEDICINE
Payer: MEDICAID

## 2020-04-03 VITALS
BODY MASS INDEX: 26.58 KG/M2 | TEMPERATURE: 98.7 F | HEART RATE: 99 BPM | SYSTOLIC BLOOD PRESSURE: 108 MMHG | WEIGHT: 150 LBS | DIASTOLIC BLOOD PRESSURE: 68 MMHG | RESPIRATION RATE: 17 BRPM | HEIGHT: 63 IN | OXYGEN SATURATION: 95 %

## 2020-04-03 DIAGNOSIS — M25.511 ACUTE PAIN OF RIGHT SHOULDER: ICD-10-CM

## 2020-04-03 DIAGNOSIS — R41.3 TRANSIENT AMNESIA: ICD-10-CM

## 2020-04-03 LAB
ALBUMIN SERPL BCP-MCNC: 4.1 G/DL (ref 3.2–4.9)
ALBUMIN/GLOB SERPL: 1.5 G/DL
ALP SERPL-CCNC: 89 U/L (ref 30–99)
ALT SERPL-CCNC: 12 U/L (ref 2–50)
ANION GAP SERPL CALC-SCNC: 11 MMOL/L (ref 7–16)
ANISOCYTOSIS BLD QL SMEAR: ABNORMAL
AST SERPL-CCNC: 24 U/L (ref 12–45)
BASOPHILS # BLD AUTO: 2.6 % (ref 0–1.8)
BASOPHILS # BLD: 0.22 K/UL (ref 0–0.12)
BILIRUB SERPL-MCNC: 0.3 MG/DL (ref 0.1–1.5)
BUN SERPL-MCNC: 6 MG/DL (ref 8–22)
CALCIUM SERPL-MCNC: 8.5 MG/DL (ref 8.5–10.5)
CHLORIDE SERPL-SCNC: 108 MMOL/L (ref 96–112)
CO2 SERPL-SCNC: 22 MMOL/L (ref 20–33)
CREAT SERPL-MCNC: 0.64 MG/DL (ref 0.5–1.4)
EOSINOPHIL # BLD AUTO: 0 K/UL (ref 0–0.51)
EOSINOPHIL NFR BLD: 0 % (ref 0–6.9)
ERYTHROCYTE [DISTWIDTH] IN BLOOD BY AUTOMATED COUNT: 45.7 FL (ref 35.9–50)
ETHANOL BLD-MCNC: <10.1 MG/DL (ref 0–10.1)
GLOBULIN SER CALC-MCNC: 2.8 G/DL (ref 1.9–3.5)
GLUCOSE SERPL-MCNC: 91 MG/DL (ref 65–99)
HCG SERPL QL: NEGATIVE
HCT VFR BLD AUTO: 26.9 % (ref 37–47)
HGB BLD-MCNC: 7.6 G/DL (ref 12–16)
HYPOCHROMIA BLD QL SMEAR: ABNORMAL
LYMPHOCYTES # BLD AUTO: 1.1 K/UL (ref 1–4.8)
LYMPHOCYTES NFR BLD: 13.2 % (ref 22–41)
MANUAL DIFF BLD: NORMAL
MCH RBC QN AUTO: 19.3 PG (ref 27–33)
MCHC RBC AUTO-ENTMCNC: 28.3 G/DL (ref 33.6–35)
MCV RBC AUTO: 68.3 FL (ref 81.4–97.8)
MICROCYTES BLD QL SMEAR: ABNORMAL
MONOCYTES # BLD AUTO: 0 K/UL (ref 0–0.85)
MONOCYTES NFR BLD AUTO: 0 % (ref 0–13.4)
MORPHOLOGY BLD-IMP: NORMAL
NEUTROPHILS # BLD AUTO: 6.99 K/UL (ref 2–7.15)
NEUTROPHILS NFR BLD: 84.2 % (ref 44–72)
NRBC # BLD AUTO: 0 K/UL
NRBC BLD-RTO: 0 /100 WBC
OVALOCYTES BLD QL SMEAR: NORMAL
PLATELET # BLD AUTO: 213 K/UL (ref 164–446)
PLATELET BLD QL SMEAR: NORMAL
POIKILOCYTOSIS BLD QL SMEAR: NORMAL
POTASSIUM SERPL-SCNC: 3.6 MMOL/L (ref 3.6–5.5)
PROT SERPL-MCNC: 6.9 G/DL (ref 6–8.2)
RBC # BLD AUTO: 3.94 M/UL (ref 4.2–5.4)
RBC BLD AUTO: PRESENT
SCHISTOCYTES BLD QL SMEAR: NORMAL
SODIUM SERPL-SCNC: 141 MMOL/L (ref 135–145)
WBC # BLD AUTO: 8.3 K/UL (ref 4.8–10.8)

## 2020-04-03 PROCEDURE — 700105 HCHG RX REV CODE 258: Performed by: EMERGENCY MEDICINE

## 2020-04-03 PROCEDURE — 99284 EMERGENCY DEPT VISIT MOD MDM: CPT

## 2020-04-03 PROCEDURE — 85027 COMPLETE CBC AUTOMATED: CPT

## 2020-04-03 PROCEDURE — 80053 COMPREHEN METABOLIC PANEL: CPT

## 2020-04-03 PROCEDURE — 70450 CT HEAD/BRAIN W/O DYE: CPT

## 2020-04-03 PROCEDURE — 84703 CHORIONIC GONADOTROPIN ASSAY: CPT

## 2020-04-03 PROCEDURE — A9270 NON-COVERED ITEM OR SERVICE: HCPCS | Performed by: EMERGENCY MEDICINE

## 2020-04-03 PROCEDURE — 85007 BL SMEAR W/DIFF WBC COUNT: CPT

## 2020-04-03 PROCEDURE — 71046 X-RAY EXAM CHEST 2 VIEWS: CPT

## 2020-04-03 PROCEDURE — 700102 HCHG RX REV CODE 250 W/ 637 OVERRIDE(OP): Performed by: EMERGENCY MEDICINE

## 2020-04-03 PROCEDURE — 73030 X-RAY EXAM OF SHOULDER: CPT | Mod: RT

## 2020-04-03 PROCEDURE — 80307 DRUG TEST PRSMV CHEM ANLYZR: CPT

## 2020-04-03 RX ORDER — IBUPROFEN 600 MG/1
600 TABLET ORAL ONCE
Status: COMPLETED | OUTPATIENT
Start: 2020-04-03 | End: 2020-04-03

## 2020-04-03 RX ORDER — SODIUM CHLORIDE 9 MG/ML
1000 INJECTION, SOLUTION INTRAVENOUS ONCE
Status: COMPLETED | OUTPATIENT
Start: 2020-04-03 | End: 2020-04-03

## 2020-04-03 RX ADMIN — SODIUM CHLORIDE 1000 ML: 9 INJECTION, SOLUTION INTRAVENOUS at 10:20

## 2020-04-03 RX ADMIN — IBUPROFEN 600 MG: 600 TABLET ORAL at 10:35

## 2020-04-03 SDOH — HEALTH STABILITY: MENTAL HEALTH: HOW OFTEN DO YOU HAVE 6 OR MORE DRINKS ON ONE OCCASION?: PATIENT DECLINED

## 2020-04-03 SDOH — HEALTH STABILITY: MENTAL HEALTH: HOW OFTEN DO YOU HAVE A DRINK CONTAINING ALCOHOL?: 4 OR MORE TIMES A WEEK

## 2020-04-03 SDOH — HEALTH STABILITY: MENTAL HEALTH: HOW MANY STANDARD DRINKS CONTAINING ALCOHOL DO YOU HAVE ON A TYPICAL DAY?: PATIENT DECLINED

## 2020-04-03 ASSESSMENT — FIBROSIS 4 INDEX: FIB4 SCORE: 0.85

## 2020-04-03 NOTE — ED NOTES
Patient discharged to home with self. Patient alert and oriented x 4, ambulatory with steady gait. Patient verbalizes understanding of discharge instructions, follow up care, return precautions, lifestyle changes, and OTC medications for pain. Patient denies questions at time of discharge.

## 2020-04-03 NOTE — ED NOTES
Patient encouraged to provide UA specimen, patient states that she is unable to pee at this time. Educated to notify staff when she is able to provide UA specimen.

## 2020-04-03 NOTE — ED NOTES
"Patient returns from bathroom and states, \"I forgot to pee in the cup\". Patient also requesting to be discharged.   "

## 2020-04-03 NOTE — ED NOTES
Patient ambulated to bathroom with instructions to provide UA specimen. Patient educated on clean catch technique and provided with urine cup and wipe.

## 2020-04-03 NOTE — ED NOTES
Film room contacted in regards to delayed CXR report. They state there was an issue with the image and they are actively working to rectify the problem.

## 2020-04-03 NOTE — DISCHARGE INSTRUCTIONS
I do not know the cause of this amnesia.  You may fall and hit her head and had a concussion, but you said you were significantly intoxicated and that may be playing a role in this also.  This time your evaluation shows no signs of significant disease you are stable for discharge home and follow-up with primary doctor
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
